# Patient Record
Sex: FEMALE | Race: WHITE | NOT HISPANIC OR LATINO | Employment: OTHER | URBAN - NONMETROPOLITAN AREA
[De-identification: names, ages, dates, MRNs, and addresses within clinical notes are randomized per-mention and may not be internally consistent; named-entity substitution may affect disease eponyms.]

---

## 2018-07-08 ENCOUNTER — APPOINTMENT (OUTPATIENT)
Dept: GENERAL RADIOLOGY | Facility: HOSPITAL | Age: 68
End: 2018-07-08

## 2018-07-08 ENCOUNTER — HOSPITAL ENCOUNTER (OUTPATIENT)
Facility: HOSPITAL | Age: 68
Discharge: HOME | End: 2018-07-09
Attending: EMERGENCY MEDICINE | Admitting: INTERNAL MEDICINE

## 2018-07-08 DIAGNOSIS — R77.8 ELEVATED TROPONIN: Primary | ICD-10-CM

## 2018-07-08 PROBLEM — R79.89 ELEVATED TROPONIN: Status: ACTIVE | Noted: 2018-07-08

## 2018-07-08 LAB
ALBUMIN SERPL-MCNC: 3.9 G/DL (ref 3.5–5)
ALBUMIN/GLOB SERPL: 1.2 G/DL (ref 1–2)
ALP SERPL-CCNC: 100 U/L (ref 38–126)
ALT SERPL W P-5'-P-CCNC: 34 U/L (ref 13–69)
ANION GAP SERPL CALCULATED.3IONS-SCNC: 12.6 MMOL/L (ref 10–20)
AST SERPL-CCNC: 47 U/L (ref 15–46)
BASOPHILS # BLD AUTO: 0.05 10*3/MM3 (ref 0–0.2)
BASOPHILS NFR BLD AUTO: 0.8 % (ref 0–2.5)
BILIRUB SERPL-MCNC: 0.6 MG/DL (ref 0.2–1.3)
BUN BLD-MCNC: 15 MG/DL (ref 7–20)
BUN/CREAT SERPL: 18.8 (ref 7.1–23.5)
CALCIUM SPEC-SCNC: 9.5 MG/DL (ref 8.4–10.2)
CHLORIDE SERPL-SCNC: 99 MMOL/L (ref 98–107)
CHOLEST SERPL-MCNC: 197 MG/DL (ref 0–199)
CO2 SERPL-SCNC: 31 MMOL/L (ref 26–30)
CREAT BLD-MCNC: 0.8 MG/DL (ref 0.6–1.3)
DEPRECATED RDW RBC AUTO: 49.1 FL (ref 37–54)
EOSINOPHIL # BLD AUTO: 0.16 10*3/MM3 (ref 0–0.7)
EOSINOPHIL NFR BLD AUTO: 2.4 % (ref 0–7)
ERYTHROCYTE [DISTWIDTH] IN BLOOD BY AUTOMATED COUNT: 14.3 % (ref 11.5–14.5)
GFR SERPL CREATININE-BSD FRML MDRD: 71 ML/MIN/1.73
GLOBULIN UR ELPH-MCNC: 3.3 GM/DL
GLUCOSE BLD-MCNC: 104 MG/DL (ref 74–98)
HCT VFR BLD AUTO: 36.9 % (ref 37–47)
HDLC SERPL-MCNC: 51 MG/DL (ref 40–60)
HGB BLD-MCNC: 11.9 G/DL (ref 12–16)
HIGH SENSITIVE C-REACTIVE PROTEIN MG/L: 7.06 MG/L (ref 1–3)
IMM GRANULOCYTES # BLD: 0.02 10*3/MM3 (ref 0–0.06)
IMM GRANULOCYTES NFR BLD: 0.3 % (ref 0–0.6)
LDLC SERPL CALC-MCNC: 128 MG/DL (ref 0–99)
LDLC/HDLC SERPL: 2.52 {RATIO}
LYMPHOCYTES # BLD AUTO: 1.84 10*3/MM3 (ref 0.6–3.4)
LYMPHOCYTES NFR BLD AUTO: 28.1 % (ref 10–50)
MCH RBC QN AUTO: 29.8 PG (ref 27–31)
MCHC RBC AUTO-ENTMCNC: 32.2 G/DL (ref 30–37)
MCV RBC AUTO: 92.5 FL (ref 81–99)
MONOCYTES # BLD AUTO: 0.85 10*3/MM3 (ref 0–0.9)
MONOCYTES NFR BLD AUTO: 13 % (ref 0–12)
NEUTROPHILS # BLD AUTO: 3.62 10*3/MM3 (ref 2–6.9)
NEUTROPHILS NFR BLD AUTO: 55.4 % (ref 37–80)
NRBC BLD MANUAL-RTO: 0 /100 WBC (ref 0–0)
PLATELET # BLD AUTO: 181 10*3/MM3 (ref 130–400)
PMV BLD AUTO: 10.5 FL (ref 6–12)
POTASSIUM BLD-SCNC: 3.6 MMOL/L (ref 3.5–5.1)
PROT SERPL-MCNC: 7.2 G/DL (ref 6.3–8.2)
RBC # BLD AUTO: 3.99 10*6/MM3 (ref 4.2–5.4)
SODIUM BLD-SCNC: 139 MMOL/L (ref 137–145)
TRIGL SERPL-MCNC: 88 MG/DL
TROPONIN I SERPL-MCNC: 0.06 NG/ML (ref 0–0.03)
TROPONIN I SERPL-MCNC: 0.18 NG/ML (ref 0–0.03)
TROPONIN I SERPL-MCNC: 0.38 NG/ML (ref 0–0.03)
VLDLC SERPL-MCNC: 17.6 MG/DL
WBC NRBC COR # BLD: 6.54 10*3/MM3 (ref 4.8–10.8)

## 2018-07-08 PROCEDURE — 80061 LIPID PANEL: CPT | Performed by: INTERNAL MEDICINE

## 2018-07-08 PROCEDURE — G0378 HOSPITAL OBSERVATION PER HR: HCPCS

## 2018-07-08 PROCEDURE — 94640 AIRWAY INHALATION TREATMENT: CPT

## 2018-07-08 PROCEDURE — 94799 UNLISTED PULMONARY SVC/PX: CPT

## 2018-07-08 PROCEDURE — 93005 ELECTROCARDIOGRAM TRACING: CPT | Performed by: PHYSICIAN ASSISTANT

## 2018-07-08 PROCEDURE — 25010000002 ENOXAPARIN PER 10 MG: Performed by: PHYSICIAN ASSISTANT

## 2018-07-08 PROCEDURE — 99220 PR INITIAL OBSERVATION CARE/DAY 70 MINUTES: CPT | Performed by: INTERNAL MEDICINE

## 2018-07-08 PROCEDURE — 80053 COMPREHEN METABOLIC PANEL: CPT | Performed by: PHYSICIAN ASSISTANT

## 2018-07-08 PROCEDURE — 99285 EMERGENCY DEPT VISIT HI MDM: CPT

## 2018-07-08 PROCEDURE — 85025 COMPLETE CBC W/AUTO DIFF WBC: CPT | Performed by: PHYSICIAN ASSISTANT

## 2018-07-08 PROCEDURE — 84443 ASSAY THYROID STIM HORMONE: CPT | Performed by: INTERNAL MEDICINE

## 2018-07-08 PROCEDURE — 96361 HYDRATE IV INFUSION ADD-ON: CPT

## 2018-07-08 PROCEDURE — 83036 HEMOGLOBIN GLYCOSYLATED A1C: CPT | Performed by: INTERNAL MEDICINE

## 2018-07-08 PROCEDURE — 25010000002 METHYLPREDNISOLONE PER 125 MG: Performed by: PHYSICIAN ASSISTANT

## 2018-07-08 PROCEDURE — 84484 ASSAY OF TROPONIN QUANT: CPT | Performed by: PHYSICIAN ASSISTANT

## 2018-07-08 PROCEDURE — 86141 C-REACTIVE PROTEIN HS: CPT | Performed by: INTERNAL MEDICINE

## 2018-07-08 PROCEDURE — 84484 ASSAY OF TROPONIN QUANT: CPT | Performed by: INTERNAL MEDICINE

## 2018-07-08 PROCEDURE — 96372 THER/PROPH/DIAG INJ SC/IM: CPT

## 2018-07-08 PROCEDURE — 71046 X-RAY EXAM CHEST 2 VIEWS: CPT

## 2018-07-08 PROCEDURE — 96374 THER/PROPH/DIAG INJ IV PUSH: CPT

## 2018-07-08 PROCEDURE — 84439 ASSAY OF FREE THYROXINE: CPT | Performed by: INTERNAL MEDICINE

## 2018-07-08 RX ORDER — IPRATROPIUM BROMIDE AND ALBUTEROL SULFATE 2.5; .5 MG/3ML; MG/3ML
3 SOLUTION RESPIRATORY (INHALATION) EVERY 4 HOURS PRN
Status: DISCONTINUED | OUTPATIENT
Start: 2018-07-08 | End: 2018-07-09 | Stop reason: HOSPADM

## 2018-07-08 RX ORDER — NAPROXEN SODIUM 220 MG
220 TABLET ORAL 2 TIMES DAILY PRN
COMMUNITY
End: 2018-07-09 | Stop reason: HOSPADM

## 2018-07-08 RX ORDER — AMLODIPINE BESYLATE 5 MG/1
5 TABLET ORAL
Status: DISCONTINUED | OUTPATIENT
Start: 2018-07-08 | End: 2018-07-08

## 2018-07-08 RX ORDER — LOSARTAN POTASSIUM AND HYDROCHLOROTHIAZIDE 25; 100 MG/1; MG/1
1 TABLET ORAL DAILY
COMMUNITY
End: 2018-07-09 | Stop reason: HOSPADM

## 2018-07-08 RX ORDER — NITROGLYCERIN 0.4 MG/1
0.4 TABLET SUBLINGUAL
Status: DISCONTINUED | OUTPATIENT
Start: 2018-07-08 | End: 2018-07-09 | Stop reason: HOSPADM

## 2018-07-08 RX ORDER — ATORVASTATIN CALCIUM 80 MG/1
80 TABLET, FILM COATED ORAL NIGHTLY
Status: DISCONTINUED | OUTPATIENT
Start: 2018-07-08 | End: 2018-07-09 | Stop reason: HOSPADM

## 2018-07-08 RX ORDER — METHYLPREDNISOLONE SODIUM SUCCINATE 125 MG/2ML
125 INJECTION, POWDER, LYOPHILIZED, FOR SOLUTION INTRAMUSCULAR; INTRAVENOUS ONCE
Status: COMPLETED | OUTPATIENT
Start: 2018-07-08 | End: 2018-07-08

## 2018-07-08 RX ORDER — CLOPIDOGREL BISULFATE 75 MG/1
600 TABLET ORAL ONCE
Status: COMPLETED | OUTPATIENT
Start: 2018-07-08 | End: 2018-07-08

## 2018-07-08 RX ORDER — LISINOPRIL 20 MG/1
20 TABLET ORAL
Status: DISCONTINUED | OUTPATIENT
Start: 2018-07-09 | End: 2018-07-09

## 2018-07-08 RX ORDER — ASPIRIN 81 MG/1
324 TABLET, CHEWABLE ORAL ONCE
Status: COMPLETED | OUTPATIENT
Start: 2018-07-08 | End: 2018-07-08

## 2018-07-08 RX ORDER — AMLODIPINE BESYLATE 5 MG/1
2.5 TABLET ORAL
Status: DISCONTINUED | OUTPATIENT
Start: 2018-07-08 | End: 2018-07-09

## 2018-07-08 RX ORDER — PANTOPRAZOLE SODIUM 40 MG/1
40 TABLET, DELAYED RELEASE ORAL EVERY MORNING
Status: DISCONTINUED | OUTPATIENT
Start: 2018-07-09 | End: 2018-07-09 | Stop reason: HOSPADM

## 2018-07-08 RX ORDER — SODIUM CHLORIDE 0.9 % (FLUSH) 0.9 %
10 SYRINGE (ML) INJECTION AS NEEDED
Status: DISCONTINUED | OUTPATIENT
Start: 2018-07-08 | End: 2018-07-09 | Stop reason: HOSPADM

## 2018-07-08 RX ORDER — ASPIRIN 81 MG/1
81 TABLET, CHEWABLE ORAL DAILY
Status: DISCONTINUED | OUTPATIENT
Start: 2018-07-09 | End: 2018-07-09 | Stop reason: HOSPADM

## 2018-07-08 RX ORDER — SODIUM CHLORIDE 0.9 % (FLUSH) 0.9 %
1-10 SYRINGE (ML) INJECTION AS NEEDED
Status: DISCONTINUED | OUTPATIENT
Start: 2018-07-08 | End: 2018-07-09 | Stop reason: HOSPADM

## 2018-07-08 RX ORDER — IPRATROPIUM BROMIDE AND ALBUTEROL SULFATE 2.5; .5 MG/3ML; MG/3ML
3 SOLUTION RESPIRATORY (INHALATION) ONCE
Status: COMPLETED | OUTPATIENT
Start: 2018-07-08 | End: 2018-07-08

## 2018-07-08 RX ORDER — METOPROLOL SUCCINATE 50 MG/1
50 TABLET, EXTENDED RELEASE ORAL
Status: DISCONTINUED | OUTPATIENT
Start: 2018-07-08 | End: 2018-07-09

## 2018-07-08 RX ORDER — LEVOCETIRIZINE DIHYDROCHLORIDE 5 MG/1
5 TABLET, FILM COATED ORAL EVERY EVENING
COMMUNITY
End: 2021-03-19 | Stop reason: SDUPTHER

## 2018-07-08 RX ORDER — MULTIPLE VITAMINS W/ MINERALS TAB 9MG-400MCG
1 TAB ORAL DAILY
COMMUNITY

## 2018-07-08 RX ADMIN — ATORVASTATIN CALCIUM 80 MG: 80 TABLET, FILM COATED ORAL at 23:31

## 2018-07-08 RX ADMIN — ENOXAPARIN SODIUM 80 MG: 80 INJECTION SUBCUTANEOUS at 18:35

## 2018-07-08 RX ADMIN — IPRATROPIUM BROMIDE AND ALBUTEROL SULFATE 3 ML: .5; 3 SOLUTION RESPIRATORY (INHALATION) at 15:28

## 2018-07-08 RX ADMIN — AMLODIPINE BESYLATE 2.5 MG: 5 TABLET ORAL at 23:31

## 2018-07-08 RX ADMIN — SODIUM CHLORIDE 1000 ML: 9 INJECTION, SOLUTION INTRAVENOUS at 16:12

## 2018-07-08 RX ADMIN — CLOPIDOGREL BISULFATE 600 MG: 75 TABLET ORAL at 18:35

## 2018-07-08 RX ADMIN — METOPROLOL SUCCINATE 50 MG: 50 TABLET, EXTENDED RELEASE ORAL at 23:31

## 2018-07-08 RX ADMIN — ASPIRIN 81 MG 324 MG: 81 TABLET ORAL at 17:01

## 2018-07-08 RX ADMIN — METHYLPREDNISOLONE SODIUM SUCCINATE 125 MG: 125 INJECTION, POWDER, FOR SOLUTION INTRAMUSCULAR; INTRAVENOUS at 16:12

## 2018-07-08 NOTE — NURSING NOTE
Received report from ed nurse AMISHA Patel. Patient was transported into room 304 at approx. 1900. Patient is without s/s of distress, call light within reach, needs met at this time, will pass report to night shift nurse AMISHA Ring.

## 2018-07-08 NOTE — H&P
[facility]   HISTORY AND PHYSICAL      Name:  Steven Hardy   Age:  68 y.o.  Sex:  female  :  1950  MRN:  [id number]   Visit Number:  [id number]  Admission Date:  2018  Date Of Service:  18    Primary Care Physician:  Amaury Moses MD    Chief Complaint:     Elevated troponins after an episode off difficulty in breathing due to using a bugs removal spray, Flea fogger    History Of Presenting Illness:      Miss Hardy is a pleasant 68-year-old  lady with history of hypertension, GERD and allergies using regular allergy shots.  She describes having used of fuming spray at her home to remove bugs but soon after developed shortness of breath.  She called her daughter who immediately called 911 and she was transferred to the emergency room.  Throughout this.  She denied chest pain, chest tightness, palpitations or syncope.  She was treated with intravenous corticosteroids and nebulized treatment in emergency room which significantly improved the symptoms and she almost feels back to normal.  However troponin levels were found to be elevated.    Review Of Systems:     Review of Systems   Constitutional: Negative.  Negative for activity change, appetite change, chills, diaphoresis, fatigue, fever and unexpected weight change.   HENT: Negative.  Negative for congestion, dental problem, drooling, ear discharge, ear pain, facial swelling, hearing loss, mouth sores, nosebleeds, postnasal drip, rhinorrhea, sinus pain, sinus pressure, sneezing, sore throat, tinnitus, trouble swallowing and voice change.    Eyes: Negative.  Negative for photophobia, pain, discharge, redness, itching and visual disturbance.   Respiratory: Positive for cough, choking (Resolved now) and shortness of breath. Negative for apnea, chest tightness, wheezing and stridor.    Cardiovascular: Negative.  Negative for chest pain, palpitations and leg swelling.   Gastrointestinal: Negative.  Negative for  abdominal distention, abdominal pain, anal bleeding, blood in stool, constipation, diarrhea, nausea, rectal pain and vomiting.   Endocrine: Negative.  Negative for cold intolerance, heat intolerance, polydipsia, polyphagia and polyuria.   Genitourinary: Negative.  Negative for decreased urine volume, difficulty urinating, dysuria, enuresis, flank pain, frequency, genital sores, hematuria and urgency.   Musculoskeletal: Negative.  Negative for arthralgias, back pain, gait problem, joint swelling, myalgias, neck pain and neck stiffness.   Skin: Negative.  Negative for color change, pallor, rash and wound.   Allergic/Immunologic: Negative.  Negative for environmental allergies, food allergies and immunocompromised state.   Neurological: Negative.  Negative for dizziness, tremors, seizures, syncope, facial asymmetry, speech difficulty, weakness, light-headedness, numbness and headaches.   Hematological: Negative.  Negative for adenopathy. Does not bruise/bleed easily.   Psychiatric/Behavioral: Negative.  Negative for agitation, behavioral problems, confusion, decreased concentration, dysphoric mood, hallucinations, self-injury, sleep disturbance and suicidal ideas. The patient is not nervous/anxious and is not hyperactive.         Past Medical History:    Past Medical History:   Diagnosis Date   • GERD (gastroesophageal reflux disease)    • H/O seasonal allergies    • Hypertension        Past Surgical history:    Past Surgical History:   Procedure Laterality Date   •  SECTION         Social History:    Social History     Social History   • Marital status: Single     Spouse name: N/A   • Number of children: N/A   • Years of education: N/A     Occupational History   • Not on file.     Social History Main Topics   • Smoking status: Never Smoker   • Smokeless tobacco: Not on file   • Alcohol use No   • Drug use: No   • Sexual activity: Not on file     Other Topics Concern   • Not on file     Social History Narrative    • No narrative on file       Family History:    History reviewed. No pertinent family history.    Allergies:      Patient has no known allergies.    Home Medications:    Prior to Admission Medications     Prescriptions Last Dose Informant Patient Reported? Taking?    ALLERGY SERUM INJECTION   Yes No    Inject  under the skin 1 (One) Time.    amoxicillin-clavulanate (AUGMENTIN) 875-125 MG per tablet   No No    Take 1 tablet by mouth 2 (Two) Times a Day.    lisinopril (PRINIVIL,ZESTRIL) 20 MG tablet   Yes No    Take 20 mg by mouth Daily.    omeprazole (priLOSEC) 20 MG capsule   Yes No    Take 20 mg by mouth Daily.    phenylephrine-promethazine-codeine (PHENERGAN VC with CODEINE) 5-6.25-10 MG/5ML syrup syrup   No No    Take 5 mL by mouth Every 4 (Four) Hours As Needed (no driving or operating machines while using this medication).             ED Medications:    Medications   sodium chloride 0.9 % flush 10 mL (not administered)   pantoprazole (PROTONIX) EC tablet 40 mg (not administered)   sodium chloride 0.9 % flush 1-10 mL (not administered)   enoxaparin (LOVENOX) syringe 40 mg (not administered)   aspirin chewable tablet 81 mg (not administered)   ipratropium-albuterol (DUO-NEB) nebulizer solution 3 mL (not administered)   sodium chloride 0.9 % bolus 1,000 mL (0 mL Intravenous Stopped 7/8/18 1823)   methylPREDNISolone sodium succinate (SOLU-Medrol) injection 125 mg (125 mg Intravenous Given 7/8/18 1612)   ipratropium-albuterol (DUO-NEB) nebulizer solution 3 mL (3 mL Nebulization Given 7/8/18 1528)   aspirin chewable tablet 324 mg (324 mg Oral Given 7/8/18 1701)   enoxaparin (LOVENOX) syringe 80 mg (80 mg Subcutaneous Given 7/8/18 1835)   clopidogrel (PLAVIX) tablet 600 mg (600 mg Oral Given 7/8/18 1835)       Vital Signs:    Temp:  [97.8 °F (36.6 °C)-98.1 °F (36.7 °C)] 97.8 °F (36.6 °C)  Heart Rate:  [] 100  Resp:  [18-20] 18  BP: (137-173)/(73-81) 155/77    1    07/08/18  1501 07/08/18  1857   Weight: 83.9  kg (185 lb) 114 kg (251 lb 5.2 oz)       Body mass index is 44.52 kg/m².    Physical Exam:    Physical Exam   Constitutional: She is oriented to person, place, and time. She appears well-developed and well-nourished.   HENT:   Head: Normocephalic.   Nose: Nose normal.   Mouth/Throat: Oropharynx is clear and moist. No oropharyngeal exudate.   Eyes: Conjunctivae and EOM are normal. Pupils are equal, round, and reactive to light. No scleral icterus.   Neck: Normal range of motion. Neck supple. No tracheal deviation present. No thyromegaly present.   Cardiovascular: Normal rate, regular rhythm, normal heart sounds and intact distal pulses.  Exam reveals no gallop and no friction rub.    Pulmonary/Chest: Effort normal and breath sounds normal. No respiratory distress. She has no wheezes. She exhibits no tenderness.   Abdominal: Soft. Bowel sounds are normal. She exhibits no distension. There is no tenderness. There is no rebound and no guarding. No hernia.   Musculoskeletal: Normal range of motion.   No muscle pain. No joint pains.   Lymphadenopathy:     She has no cervical adenopathy.   Neurological: She is alert and oriented to person, place, and time. She displays normal reflexes. She exhibits normal muscle tone. Coordination normal.   Skin: Skin is warm.   Psychiatric: She has a normal mood and affect. Judgment and thought content normal.   Vitals reviewed.      Laboratory data:    I have reviewed the labs done in the emergency room.      Results from last 7 days  Lab Units 07/08/18  1608   SODIUM mmol/L 139   POTASSIUM mmol/L 3.6   CHLORIDE mmol/L 99   CO2 mmol/L 31.0*   BUN mg/dL 15   CREATININE mg/dL 0.80   CALCIUM mg/dL 9.5   BILIRUBIN mg/dL 0.6   ALK PHOS U/L 100   ALT (SGPT) U/L 34   AST (SGOT) U/L 47*   GLUCOSE mg/dL 104*       Results from last 7 days  Lab Units 07/08/18  1608   WBC 10*3/mm3 6.54   HEMOGLOBIN g/dL 11.9*   HEMATOCRIT % 36.9*   PLATELETS 10*3/mm3 181           Results from last 7 days  Lab  Units 07/08/18  1708 07/08/18  1608   TROPONIN I ng/mL 0.178* 0.061*                           Invalid input(s): USDES,  BLOODU, NITRITITE, BACT, EP  Pain Management Panel     There is no flowsheet data to display.              EKG:      Normal sinus rhythm with no obvious evidence of ischemia or hypertrophy.    Radiology:    Imaging Results (last 72 hours)     Procedure Component Value Units Date/Time    XR Chest 2 View [[id number]] Collected:  07/08/18 1520     Updated:  07/08/18 1535    Narrative:       PROCEDURE: XR CHEST 2 VW-        HISTORY: allergic rxn     COMPARISON: None.     FINDINGS: The heart is normal in size. The mediastinum is unremarkable.  The lungs are clear. There is no pneumothorax. There are no acute  osseous abnormalities. There is loss of disc space height and anterior  osteophyte formation throughout the thoracic spine.       Impression:       No acute cardiopulmonary process.           This report was finalized on 7/8/2018 3:20 PM by Tiffanie Jiménez M.D..          Active Problems:    Elevated troponin      Assessment:    Elevated troponins.  Episode of shortness of breath and choking after using a smoke to get rid of bugs    Plan:    1. Admit to telemetry for cardiac monitoring  2. Patient has received aspirin and Plavix emergency room and is currently asymptomatic, Dr. Tidwell is aware about the admission and will review the patient in the morning  3. DVT and GI prophylaxis  4. I had a detailed discussion with Patient and accompanying family members.    Kenny Beltran MD  07/08/18  7:32 PM    Portions of this note may have been completed with Dragon, a voice recognition program. Not all errors in transcription may have been detected prior to signing.

## 2018-07-08 NOTE — ED PROVIDER NOTES
Subjective   Patient is here via EMS with reported possible allergic reaction after walking into a room that she had used a bug bomb,, fumigate.. She does endorse history of seasonal allergies, takes allergy shots for this denies chest pain  Did have shortness of air...feels better currently than initially when EMS arrived again no chest pain  Some shortness of air,.resolved now. No abdominal pain no nausea vomiting        History provided by:  Patient      Review of Systems   Constitutional: Negative.    HENT: Negative.    Eyes: Negative.    Respiratory: Negative.  Negative for chest tightness and stridor.    Cardiovascular: Negative.    Gastrointestinal: Negative.    Genitourinary: Negative.    Musculoskeletal: Negative.    Skin: Negative.    Neurological: Negative.    Psychiatric/Behavioral: The patient is nervous/anxious.    All other systems reviewed and are negative.      Past Medical History:   Diagnosis Date   • GERD (gastroesophageal reflux disease)    • H/O seasonal allergies    • Hypertension        No Known Allergies    Past Surgical History:   Procedure Laterality Date   •  SECTION         History reviewed. No pertinent family history.    Social History     Social History   • Marital status: Single     Social History Main Topics   • Smoking status: Never Smoker   • Alcohol use No   • Drug use: No     Other Topics Concern   • Not on file           Objective   Physical Exam   Constitutional: She appears well-developed and well-nourished.   Afebrile nontoxic no acute distress   HENT:   Head: Normocephalic and atraumatic.   Mouth/Throat: Oropharynx is clear and moist. No oropharyngeal exudate.   Eyes: Conjunctivae are normal. Pupils are equal, round, and reactive to light.   Neck: Normal range of motion. Neck supple.   Cardiovascular: Normal rate, regular rhythm and intact distal pulses.    Pulmonary/Chest: Effort normal. No respiratory distress. She has rales.   Abdominal: Soft. She exhibits no  mass. There is no tenderness.   Musculoskeletal: Normal range of motion. She exhibits no edema.   Skin: Skin is warm. Capillary refill takes less than 2 seconds. No rash noted. She is not diaphoretic. No erythema.   Psychiatric: Her behavior is normal. Judgment and thought content normal.   Nursing note and vitals reviewed.      Procedures           ED Course  ED Course as of Jul 08 1809   Sun Jul 08, 2018   1550 Patient resting feels better no acute distress  [SC]   1600 EKG interpreted by me: Sinus rhythm, normal rate, normal axis/intervals, low voltage QRS complexes, this is an abnormal EKG, no previous for comparison  [MP]   1610 EKG reviewed Dr Jones  [SC]   1643 Pt case labs management reviewed with Dr. Jones  [SC]   1725 Patient resting comfortably no distress no chest pain or shortness of air states she feels better  [SC]   1743 Page Dr Tidwell  [SC]   1747 Discussed with Dr. Tidwell will admit to hospitalist he will consult  [SC]   3782 Discussed with ./..  Accepts admission  [SC]      ED Course User Index  [MP] Lisandro Jones MD  [SC] Gio Michael PA-C                  [facility]      Final diagnoses:   Elevated troponin            Gio Michael PA-C  07/08/18 3501

## 2018-07-08 NOTE — ED NOTES
Requested tele bed from Rei Unger, @ 8401. Will call back w/ bed assignment.      Alma Rosa Monreal  07/08/18 5138

## 2018-07-08 NOTE — ED NOTES
Rei Unger, assigned Room 304/Tele @ 1804. AMISHA Richardson, notified.      Alma Rosa Monreal  07/08/18 1800

## 2018-07-09 ENCOUNTER — APPOINTMENT (OUTPATIENT)
Dept: CARDIOLOGY | Facility: HOSPITAL | Age: 68
End: 2018-07-09
Attending: INTERNAL MEDICINE

## 2018-07-09 VITALS
TEMPERATURE: 98.4 F | OXYGEN SATURATION: 96 % | DIASTOLIC BLOOD PRESSURE: 56 MMHG | HEIGHT: 63 IN | SYSTOLIC BLOOD PRESSURE: 134 MMHG | WEIGHT: 251.32 LBS | HEART RATE: 67 BPM | BODY MASS INDEX: 44.53 KG/M2 | RESPIRATION RATE: 16 BRPM

## 2018-07-09 PROBLEM — IMO0001 CLASS 3 OBESITY DUE TO EXCESS CALORIES WITH SERIOUS COMORBIDITY AND BODY MASS INDEX (BMI) OF 40.0 TO 44.9 IN ADULT: Status: ACTIVE | Noted: 2018-07-09

## 2018-07-09 PROBLEM — I21.4 ACUTE NON Q WAVE MI (MYOCARDIAL INFARCTION), INITIAL EPISODE OF CARE (HCC): Status: ACTIVE | Noted: 2018-07-09

## 2018-07-09 PROBLEM — I10 ESSENTIAL HYPERTENSION: Status: ACTIVE | Noted: 2018-07-09

## 2018-07-09 LAB
HBA1C MFR BLD: 6.3 % (ref 3–6)
MAXIMAL PREDICTED HEART RATE: 152 BPM
STRESS TARGET HR: 129 BPM
T4 FREE SERPL-MCNC: 1.28 NG/DL (ref 0.78–2.19)
TROPONIN I SERPL-MCNC: 0.37 NG/ML (ref 0–0.03)
TROPONIN I SERPL-MCNC: 0.55 NG/ML (ref 0–0.03)
TSH SERPL DL<=0.05 MIU/L-ACNC: 0.8 MIU/ML (ref 0.47–4.68)

## 2018-07-09 PROCEDURE — 63710000001 LOSARTAN 50 MG TABLET: Performed by: NURSE PRACTITIONER

## 2018-07-09 PROCEDURE — 99217 PR OBSERVATION CARE DISCHARGE MANAGEMENT: CPT | Performed by: NURSE PRACTITIONER

## 2018-07-09 PROCEDURE — 93458 L HRT ARTERY/VENTRICLE ANGIO: CPT | Performed by: INTERNAL MEDICINE

## 2018-07-09 PROCEDURE — 0 IOPAMIDOL PER 1 ML: Performed by: INTERNAL MEDICINE

## 2018-07-09 PROCEDURE — 25010000002 MIDAZOLAM PER 1 MG: Performed by: INTERNAL MEDICINE

## 2018-07-09 PROCEDURE — C1894 INTRO/SHEATH, NON-LASER: HCPCS | Performed by: INTERNAL MEDICINE

## 2018-07-09 PROCEDURE — 84484 ASSAY OF TROPONIN QUANT: CPT | Performed by: NURSE PRACTITIONER

## 2018-07-09 PROCEDURE — 84484 ASSAY OF TROPONIN QUANT: CPT | Performed by: INTERNAL MEDICINE

## 2018-07-09 PROCEDURE — A9270 NON-COVERED ITEM OR SERVICE: HCPCS | Performed by: INTERNAL MEDICINE

## 2018-07-09 PROCEDURE — 93005 ELECTROCARDIOGRAM TRACING: CPT | Performed by: INTERNAL MEDICINE

## 2018-07-09 PROCEDURE — 63710000001 ASPIRIN 81 MG CHEWABLE TABLET: Performed by: INTERNAL MEDICINE

## 2018-07-09 PROCEDURE — G0378 HOSPITAL OBSERVATION PER HR: HCPCS

## 2018-07-09 PROCEDURE — C1769 GUIDE WIRE: HCPCS | Performed by: INTERNAL MEDICINE

## 2018-07-09 PROCEDURE — 25010000002 FENTANYL CITRATE (PF) 100 MCG/2ML SOLUTION: Performed by: INTERNAL MEDICINE

## 2018-07-09 PROCEDURE — 93306 TTE W/DOPPLER COMPLETE: CPT

## 2018-07-09 PROCEDURE — A9270 NON-COVERED ITEM OR SERVICE: HCPCS | Performed by: NURSE PRACTITIONER

## 2018-07-09 PROCEDURE — 25010000002 HEPARIN (PORCINE) PER 1000 UNITS: Performed by: INTERNAL MEDICINE

## 2018-07-09 RX ORDER — METOPROLOL SUCCINATE 25 MG/1
25 TABLET, EXTENDED RELEASE ORAL
Qty: 30 TABLET | Refills: 0 | Status: SHIPPED | OUTPATIENT
Start: 2018-07-10 | End: 2021-03-19

## 2018-07-09 RX ORDER — ATORVASTATIN CALCIUM 20 MG/1
20 TABLET, FILM COATED ORAL NIGHTLY
Qty: 30 TABLET | Refills: 0 | Status: SHIPPED | OUTPATIENT
Start: 2018-07-09

## 2018-07-09 RX ORDER — ACETAMINOPHEN 325 MG/1
650 TABLET ORAL EVERY 4 HOURS PRN
Status: DISCONTINUED | OUTPATIENT
Start: 2018-07-09 | End: 2018-07-09 | Stop reason: HOSPADM

## 2018-07-09 RX ORDER — METOPROLOL SUCCINATE 25 MG/1
25 TABLET, EXTENDED RELEASE ORAL
Status: DISCONTINUED | OUTPATIENT
Start: 2018-07-10 | End: 2018-07-09 | Stop reason: HOSPADM

## 2018-07-09 RX ORDER — BLOOD-GLUCOSE METER
EACH MISCELLANEOUS
Qty: 1 KIT | Refills: 0 | Status: SHIPPED | OUTPATIENT
Start: 2018-07-09

## 2018-07-09 RX ORDER — FENTANYL CITRATE 50 UG/ML
INJECTION, SOLUTION INTRAMUSCULAR; INTRAVENOUS AS NEEDED
Status: DISCONTINUED | OUTPATIENT
Start: 2018-07-09 | End: 2018-07-09 | Stop reason: HOSPADM

## 2018-07-09 RX ORDER — AMLODIPINE BESYLATE 5 MG/1
5 TABLET ORAL
Status: DISCONTINUED | OUTPATIENT
Start: 2018-07-10 | End: 2018-07-09 | Stop reason: HOSPADM

## 2018-07-09 RX ORDER — BLOOD-GLUCOSE METER
KIT MISCELLANEOUS
Qty: 100 EACH | Refills: 0 | Status: SHIPPED | OUTPATIENT
Start: 2018-07-09 | End: 2018-07-09

## 2018-07-09 RX ORDER — HYDROCODONE BITARTRATE AND ACETAMINOPHEN 5; 325 MG/1; MG/1
1 TABLET ORAL EVERY 4 HOURS PRN
Status: DISCONTINUED | OUTPATIENT
Start: 2018-07-09 | End: 2018-07-09 | Stop reason: HOSPADM

## 2018-07-09 RX ORDER — LIDOCAINE HYDROCHLORIDE 10 MG/ML
INJECTION, SOLUTION INFILTRATION; PERINEURAL AS NEEDED
Status: DISCONTINUED | OUTPATIENT
Start: 2018-07-09 | End: 2018-07-09 | Stop reason: HOSPADM

## 2018-07-09 RX ORDER — LOSARTAN POTASSIUM 50 MG/1
100 TABLET ORAL
Status: DISCONTINUED | OUTPATIENT
Start: 2018-07-09 | End: 2018-07-09 | Stop reason: HOSPADM

## 2018-07-09 RX ORDER — AMLODIPINE BESYLATE 5 MG/1
5 TABLET ORAL
Qty: 30 TABLET | Refills: 0 | Status: SHIPPED | OUTPATIENT
Start: 2018-07-10 | End: 2021-03-19

## 2018-07-09 RX ORDER — ASPIRIN 81 MG/1
81 TABLET, CHEWABLE ORAL DAILY
Qty: 30 TABLET | Refills: 0 | Status: SHIPPED | OUTPATIENT
Start: 2018-07-10 | End: 2021-03-27 | Stop reason: HOSPADM

## 2018-07-09 RX ORDER — SODIUM CHLORIDE 9 MG/ML
100 INJECTION, SOLUTION INTRAVENOUS CONTINUOUS
Status: DISCONTINUED | OUTPATIENT
Start: 2018-07-09 | End: 2018-07-09

## 2018-07-09 RX ORDER — MIDAZOLAM HYDROCHLORIDE 1 MG/ML
INJECTION INTRAMUSCULAR; INTRAVENOUS AS NEEDED
Status: DISCONTINUED | OUTPATIENT
Start: 2018-07-09 | End: 2018-07-09 | Stop reason: HOSPADM

## 2018-07-09 RX ORDER — LOSARTAN POTASSIUM 50 MG/1
50 TABLET ORAL
Qty: 30 TABLET | Refills: 0 | Status: SHIPPED | OUTPATIENT
Start: 2018-07-10

## 2018-07-09 RX ADMIN — LOSARTAN POTASSIUM 100 MG: 50 TABLET, FILM COATED ORAL at 09:45

## 2018-07-09 RX ADMIN — PANTOPRAZOLE SODIUM 40 MG: 40 TABLET, DELAYED RELEASE ORAL at 06:16

## 2018-07-09 RX ADMIN — ASPIRIN 81 MG 81 MG: 81 TABLET ORAL at 09:45

## 2018-07-09 NOTE — PROGRESS NOTES
Discharge Planning Assessment  [facility]     Patient Name: Steven Hardy  MRN: [id number]  Today's Date: 7/9/2018    Admit Date: 7/8/2018          Discharge Needs Assessment     Row Name 07/09/18 0942       Living Environment    Lives With alone    Unique Family Situation --   States brother stays with her sometimes    Primary Care Provided by self    Provides Primary Care For no one    Family Caregiver if Needed none    Able to Return to Prior Arrangements yes       Discharge Needs Assessment    Readmission Within the Last 30 Days no previous admission in last 30 days    Concerns to be Addressed no discharge needs identified    Anticipated Changes Related to Illness none    Equipment Needed After Discharge none    Offered/Gave Vendor List other (see comments)   none needed            Discharge Plan     Row Name 07/09/18 0947       Plan    Plan Spoke to pt in room.  has no POA.  Address and phone # is correct.  Lives in 2 story house, brother stays with her often.  Has steps but does not have to use them.  Independent with all ADLS, has has no Medical equipment.  Plans to go home at discharge, has own transportation and denies discharge needs.          Destination     No service coordination in this encounter.      Durable Medical Equipment     No service coordination in this encounter.      Dialysis/Infusion     No service coordination in this encounter.      Home Medical Care     No service coordination in this encounter.      Social Care     No service coordination in this encounter.                Demographic Summary     Row Name 07/09/18 0954       General Information    Admission Type observation    Required Notices Provided Observation Status Notice    Referral Source admission list    Reason for Consult discharge planning            Functional Status     Row Name 07/09/18 0904       Mental Status Summary    Recent Changes in Mental Status/Cognitive Functioning no changes    Row Name 07/09/18 0920        Functional Status    Usual Activity Tolerance good    Current Activity Tolerance good       Functional Status, IADL    Medications independent    Meal Preparation independent    Housekeeping independent    Laundry independent    Shopping independent            Psychosocial     Row Name 07/09/18 0933       Coping/Stress    Major Change/Loss/Stressor none            Abuse/Neglect    No documentation.           Legal    No documentation.           Substance Abuse    No documentation.           Patient Forms    No documentation.         Nori Brown

## 2018-07-09 NOTE — DISCHARGE SUMMARY
[facility] HOSPITALIST   DISCHARGE SUMMARY    Name:  Steven Hardy   Age:  68 y.o.  Sex:  female  :  1950  MRN:  [id number]   Visit Number:  [id number]  Primary Care Physician:  Amaury Moses MD  Date of Discharge:  2018  Admission Date:  2018      Presenting Problem:    Elevated troponin [R74.8]       Discharge Diagnosis:     Principal Problem:    Acute non Q wave MI (myocardial infarction), initial episode of care (CMS/[facility])  Active Problems:    Elevated troponin    Essential hypertension    Class 3 obesity due to excess calories with serious comorbidity and body mass index (BMI) of 40.0 to 44.9 in adult (CMS/[facility])        Past Medical History:  Past Medical History:   Diagnosis Date   • GERD (gastroesophageal reflux disease)    • H/O seasonal allergies    • Hypertension          Consults:     Consults     Date and Time Order Name Status Description    2018 1803 Inpatient Cardiology Consult            Procedures Performed:    Procedure(s):  Left Heart Cath         History of presenting illness:  This is a 68-year-old female past medical history significant for hypertension, gastroesophageal reflux and allergies early on allergy injections.  Patient had been using a few minutes.  Her home to remove Biaxin shortly after she developed shortness of breath.  She called her daughter was brought into the emergency room.  She denied any chest pain, chest tightness, palpitations or syncope.  She was given IV steroids in the emergency room and this did alleviate the symptoms.  However she was noted to have elevated troponin on her baseline blood work.  She was admitted observation to the hospitalist service for further medical management.      Hospital Course:  Upon admission to the hospitalist service serial cardiac enzymes were done which did show further elevation of her troponin.  Dr. Tidwell with cardiology was consulted.  She was noted to have significantly elevated blood pressure.   He felt this may be secondary to her avoidance of beta blocker therapy.  He did start her on amlodipine in addition to her arm.  He discussed with the patient regarding starting her on an ACE inhibitor however according to the patient she cannot tolerated ACE inhibitor as it does cause cough.    Patient was taken down this morning where she did undergo a heart catheterization.  She was noted to have no blockages required intervention.  The catheterization report for details.    Patient was noted to have significantly elevated blood pressure.  She was started on beta blocker as well as continued on her losartan and Norvasc has been added as well.  She will need to monitor blood pressures at home and follow-up with her primary care provider.    Patient was also noted to have mildly elevated blood sugar for which a hemoglobin A1c was obtained.  Her A1c is 6.3.  I have asked diabetes educator to consult patient and give information on dietary control of her blood sugars.  I will also ask that patient check BS twice daily and keep a record of results.  Follow up with PCP to review the log of results.    Patient is currently lying in bed comfortably.  She has a TR band on the right wrist.  I have spoken with cardiology who feels that her next troponin is trending down and we can plan to discharge her home this afternoon.  I will give her prescriptions for any new medications and she will need to follow-up with her primary care provider in one week.  I also asked the patient monitors her blood sugars at home records this and takes and her primary care provider 1-2 weeks to follow-up.    Patient at this time denies any chest pain, shortness breath, abdominal pain, nausea vomiting.  She is otherwise stable from the hospitalist standpoint if her next troponin is trending down to discharge home.    Addendum:  Repeat troponin is improving.  We will discharge patient today and have her follow up with PCP in 1 week and cardiology  in 1 month.  I will give patient a prescription for glucometer, lancets and strips.  She need to check BS before breakfast and at bedtime.  Record a log of results and follow up with PCP in 2-4 weeks.      Vital Signs:    Temp:  [97.8 °F (36.6 °C)-98.4 °F (36.9 °C)] 98.4 °F (36.9 °C)  Heart Rate:  [] 57  Resp:  [16-20] 16  BP: (119-176)/(53-88) 122/88    Physical Exam:  General Appearance:    Alert and cooperative, not in any acute distress.   Head:    Atraumatic and normocephalic, without obvious abnormality.   Eyes:            PERRLA, conjunctivae and sclerae normal, no Icterus. No pallor. Extra-occular movements are within normal limits.   Ears:    Ears appear intact with no abnormalities noted.   Throat:   No oral lesions, no thrush, oral mucosa moist.   Neck:   Supple, trachea midline, no thyromegaly, no carotid bruit.   Back:     No kyphoscoliosis present. No tenderness to palpation,   range of motion normal.   Lungs:     Chest shape is normal. Breath sounds heard bilaterally equally.  No crackles or wheezing. No Pleural rub or bronchial breathing.    Heart:    Normal S1 and S2, no murmur, no gallop, no rub. No JVD   Abdomen:     Normal bowel sounds, no masses, no organomegaly. Soft        non-tender, non-distended, no guarding, no rebound                tenderness   Extremities:   Moves all extremities well, no edema, no cyanosis, no             Clubbing, TR band to right wrist.   Pulses:   Pulses palpable and equal bilaterally   Skin:   No bleeding, bruising or rash   Lymph nodes:  Psychiatric/Behavior:    No palpable adenopathy    Normal mood, normal behavior   Neurologic:   Cranial nerves 2 - 12 grossly intact, sensation intact, Motor power is normal and equal bilaterally.           Pertinent Lab Results:       Results from last 7 days  Lab Units 07/08/18  1608   SODIUM mmol/L 139   POTASSIUM mmol/L 3.6   CHLORIDE mmol/L 99   CO2 mmol/L 31.0*   BUN mg/dL 15   CREATININE mg/dL 0.80   CALCIUM mg/dL 9.5    BILIRUBIN mg/dL 0.6   ALK PHOS U/L 100   ALT (SGPT) U/L 34   AST (SGOT) U/L 47*   GLUCOSE mg/dL 104*       Results from last 7 days  Lab Units 07/08/18  1608   WBC 10*3/mm3 6.54   HEMOGLOBIN g/dL 11.9*   HEMATOCRIT % 36.9*   PLATELETS 10*3/mm3 181                Pertinent Radiology Results:    Imaging Results (all)     Procedure Component Value Units Date/Time    XR Chest 2 View [[id number]] Collected:  07/08/18 1520     Updated:  07/08/18 1535    Narrative:       PROCEDURE: XR CHEST 2 VW-        HISTORY: allergic rxn     COMPARISON: None.     FINDINGS: The heart is normal in size. The mediastinum is unremarkable.  The lungs are clear. There is no pneumothorax. There are no acute  osseous abnormalities. There is loss of disc space height and anterior  osteophyte formation throughout the thoracic spine.       Impression:       No acute cardiopulmonary process.           This report was finalized on 7/8/2018 3:20 PM by Tiffanie Jiménez M.D..          Condition on Discharge:    Stable        Discharge Disposition:        Discharge Medication:       Discharge Medications      New Medications      Instructions Start Date   amLODIPine 5 MG tablet  Commonly known as:  NORVASC   5 mg, Oral, Every 24 Hours Scheduled   Start Date:  7/10/2018     aspirin 81 MG chewable tablet   81 mg, Oral, Daily   Start Date:  7/10/2018     atorvastatin 20 MG tablet  Commonly known as:  LIPITOR   20 mg, Oral, Nightly      losartan 50 MG tablet  Commonly known as:  COZAAR   50 mg, Oral, Every 24 Hours Scheduled   Start Date:  7/10/2018     metoprolol succinate XL 25 MG 24 hr tablet  Commonly known as:  TOPROL-XL   25 mg, Oral, Every 24 Hours Scheduled   Start Date:  7/10/2018        Continue These Medications      Instructions Start Date   ALLERGY SERUM INJECTION   Subcutaneous, Once      levocetirizine 5 MG tablet  Commonly known as:  XYZAL   5 mg, Oral, Every Evening      multivitamin with minerals tablet tablet   1 tablet, Oral, Daily       omeprazole 20 MG capsule  Commonly known as:  priLOSEC   20 mg, Oral, Daily         Stop These Medications    amoxicillin-clavulanate 875-125 MG per tablet  Commonly known as:  AUGMENTIN     lisinopril 20 MG tablet  Commonly known as:  PRINIVIL,ZESTRIL     losartan-hydrochlorothiazide 100-25 MG per tablet  Commonly known as:  HYZAAR     naproxen sodium 220 MG tablet  Commonly known as:  ALEVE     phenylephrine-promethazine-codeine 5-6.25-10 MG/5ML syrup syrup  Commonly known as:  PHENERGAN VC with CODEINE            Discharge Diet:     Diet Instructions     Diet: Consistent Carbohydrate, Cardiac; Thin       Discharge Diet:   Consistent Carbohydrate  Cardiac       Fluid Consistency:  Thin          Activity at Discharge:     Activity Instructions     Discharge Activity             Follow-up Appointments:  Follow up PCP in 1 week  Follow up with Dr. Tidwell 1 month  Check blood sugar twice daily before breakfast and at bedtime.  Record and take to PCP in 2-4 weeks to follow up with log of BS results  Monitor BP at home daily and record and take to PCP with results.               Graciela Finn, JENNIFER  07/09/18  1:18 PM

## 2018-07-09 NOTE — CONSULTS
Referring Provider: Dr Beltran   Reason for Consultation: Non q MI     Patient Care Team:  Amaury Moses MD as PCP - General    History of present illness: Middle-aged lady who has multiple issues with respect to allergies yesterday was playing house with something nail thought she had forgotten about a room and went back to the second time when she got exposed to fumes felt a choking sensation in the neck and had severe difficulty breathing.  This lasted for about an hour to 2 hours.  Pain telemetry she got to the emergency room at the emergency room patient was almost feeling better when her initial workup was done.    Since she's been in the hospital she's been doing reasonably well.  Has not had this symptoms again.    She is not functionally very active usually does ride morning of the long.  Has never done any exercise on a regular basis.  Has significant problems with respect to breathing that she is exposed to multiple allergens.  Takes multiple allergy shots on a regular basis.    Review of Systems   Pertinent items are noted in HPI  Review of Systems      History  1 Baseline EKG sinus rhythm OR interval of 194 ms rate of 60 beats a minute nonspecific ST wave changes.    #2 LV function assessment: Pending.    #3 CAD workup: None.    #4 obesity.    #5 hypertension.  Her pressure stays high.    #6 dyslipidemia.    #7 postmenopausal status.    #8 multiple allergies requiring allergy shots.    #9 arthritis.    #10    Personal history:    Nonsmoker does not drink alcohol does not appear struck function status of the patient has been reasonable.    Family history noncontributory has a brother who has not had CAD both parents have had CAD.    Review of symptoms:    There is no cough cold fever chills nausea vomiting diarrhea no abdominal pain loss of consciousness PND orthopnea stroke weakness joint swelling rest of the review of symptoms are negative.      Past Surgical History:   Procedure Laterality Date   •   SECTION     , History reviewed. No pertinent family history., Social History   Substance Use Topics   • Smoking status: Never Smoker   • Smokeless tobacco: Not on file   • Alcohol use No   , Prescriptions Prior to Admission   Medication Sig Dispense Refill Last Dose   • ALLERGY SERUM INJECTION Inject  under the skin 1 (One) Time.   Past Week at Unknown time   • levocetirizine (XYZAL) 5 MG tablet Take 5 mg by mouth Every Evening.   2018 at Unknown time   • lisinopril (PRINIVIL,ZESTRIL) 20 MG tablet Take 20 mg by mouth Daily.   Past Month at Unknown time   • losartan-hydrochlorothiazide (HYZAAR) 100-25 MG per tablet Take 1 tablet by mouth Daily.   2018 at Unknown time   • Multiple Vitamins-Minerals (MULTIVITAMIN WITH MINERALS) tablet tablet Take 1 tablet by mouth Daily.   2018 at Unknown time   • naproxen sodium (ALEVE) 220 MG tablet Take 220 mg by mouth 2 (Two) Times a Day As Needed.   2018 at Unknown time   • omeprazole (priLOSEC) 20 MG capsule Take 20 mg by mouth Daily.   2018 at Unknown time   • amoxicillin-clavulanate (AUGMENTIN) 875-125 MG per tablet Take 1 tablet by mouth 2 (Two) Times a Day. 20 tablet 0 More than a month at Unknown time   • phenylephrine-promethazine-codeine (PHENERGAN VC with CODEINE) 5-6.25-10 MG/5ML syrup syrup Take 5 mL by mouth Every 4 (Four) Hours As Needed (no driving or operating machines while using this medication). 118 mL 0 More than a month at Unknown time   , Scheduled Meds:    amLODIPine 2.5 mg Oral Q24H   aspirin 81 mg Oral Daily   atorvastatin 80 mg Oral Nightly   enoxaparin 40 mg Subcutaneous Q24H   lisinopril 20 mg Oral Q24H   metoprolol succinate XL 50 mg Oral Q24H   pantoprazole 40 mg Oral QAM   , Continuous Infusions:   , PRN Meds:  ipratropium-albuterol  •  nitroglycerin  •  sodium chloride  •  Insert peripheral IV **AND** sodium chloride, Allergies:  Patient has no known allergies.     Objective     Vital Sign Min/Max for last 24 hours  Temp   "Min: 97.8 °F (36.6 °C)  Max: 98.4 °F (36.9 °C)   BP  Min: 137/73  Max: 176/80   Pulse  Min: 82  Max: 105   Resp  Min: 18  Max: 20   SpO2  Min: 90 %  Max: 96 %   No Data Recorded   Weight  Min: 83.9 kg (185 lb)  Max: 114 kg (251 lb 5.2 oz)     Flowsheet Rows      First Filed Value   Admission Height  160 cm (63\") Documented at 07/08/2018 1501   Admission Weight  83.9 kg (185 lb) Documented at 07/08/2018 1501             Physical Exam:     General Appearance:    Alert, cooperative, in no acute distress   Head:    Normocephalic, without obvious abnormality, atraumatic   Eyes:            Lids and lashes normal, conjunctivae and sclerae normal, no   icterus, no pallor, corneas clear, PERRLA   Ears:    Ears appear intact with no abnormalities noted   Throat:   No oral lesions, no thrush, oral mucosa moist   Neck:   No adenopathy, supple, trachea midline, no thyromegaly, no     carotid bruit, no JVD   Back:     No kyphosis present, no scoliosis present, no skin lesions,       erythema or scars, no tenderness to percussion or                   palpation,   range of motion normal   Lungs:     Clear to auscultation,respirations regular, even and                   unlabored    Heart:    Regular rhythm and normal rate, normal S1 and S2, no            murmur, no gallop, no rub, no click   Breast Exam:    Deferred   Abdomen:     Normal bowel sounds, no masses, no organomegaly, soft        non-tender, non-distended, no guarding, no rebound                 tenderness   Genitalia:    Deferred   Extremities:   Moves all extremities well, no edema, no cyanosis, no              redness   Pulses:   Pulses palpable and equal bilaterally   Skin:   No bleeding, bruising or rash   Lymph nodes:   No palpable adenopathy   Neurologic:   Cranial nerves 2 - 12 grossly intact, sensation intact, DTR        present and equal bilaterally       Results Review:   I reviewed the patient's new clinical results.    EKG: Sinus rhythm SD interval of 100 " and 94 Ms Rate of 59 Beats a Minute Early Repolarization Changes Noted.  Nonspecific ST Changes.    LAB DATA :       Results from last 7 days  Lab Units 07/08/18  2208   CHOLESTEROL mg/dL 197   TRIGLYCERIDES mg/dL 88   HDL CHOL mg/dL 51   LDL CHOL mg/dL 128*       WBC   Date Value Ref Range Status   07/08/2018 6.54 4.80 - 10.80 10*3/mm3 Final     RBC   Date Value Ref Range Status   07/08/2018 3.99 (L) 4.20 - 5.40 10*6/mm3 Final     Hemoglobin   Date Value Ref Range Status   07/08/2018 11.9 (L) 12.0 - 16.0 g/dL Final     Hematocrit   Date Value Ref Range Status   07/08/2018 36.9 (L) 37.0 - 47.0 % Final     MCV   Date Value Ref Range Status   07/08/2018 92.5 81.0 - 99.0 fL Final     MCH   Date Value Ref Range Status   07/08/2018 29.8 27.0 - 31.0 pg Final     MCHC   Date Value Ref Range Status   07/08/2018 32.2 30.0 - 37.0 g/dL Final     RDW   Date Value Ref Range Status   07/08/2018 14.3 11.5 - 14.5 % Final     RDW-SD   Date Value Ref Range Status   07/08/2018 49.1 37.0 - 54.0 fl Final     MPV   Date Value Ref Range Status   07/08/2018 10.5 6.0 - 12.0 fL Final     Platelets   Date Value Ref Range Status   07/08/2018 181 130 - 400 10*3/mm3 Final     Neutrophil %   Date Value Ref Range Status   07/08/2018 55.4 37.0 - 80.0 % Final     Lymphocyte %   Date Value Ref Range Status   07/08/2018 28.1 10.0 - 50.0 % Final     Monocyte %   Date Value Ref Range Status   07/08/2018 13.0 (H) 0.0 - 12.0 % Final     Eosinophil %   Date Value Ref Range Status   07/08/2018 2.4 0.0 - 7.0 % Final     Basophil %   Date Value Ref Range Status   07/08/2018 0.8 0.0 - 2.5 % Final     Immature Grans %   Date Value Ref Range Status   07/08/2018 0.3 0.0 - 0.6 % Final     Neutrophils, Absolute   Date Value Ref Range Status   07/08/2018 3.62 2.00 - 6.90 10*3/mm3 Final     Lymphocytes, Absolute   Date Value Ref Range Status   07/08/2018 1.84 0.60 - 3.40 10*3/mm3 Final     Monocytes, Absolute   Date Value Ref Range Status   07/08/2018 0.85 0.00 -  0.90 10*3/mm3 Final     Eosinophils, Absolute   Date Value Ref Range Status   07/08/2018 0.16 0.00 - 0.70 10*3/mm3 Final     Basophils, Absolute   Date Value Ref Range Status   07/08/2018 0.05 0.00 - 0.20 10*3/mm3 Final     Immature Grans, Absolute   Date Value Ref Range Status   07/08/2018 0.02 0.00 - 0.06 10*3/mm3 Final     nRBC   Date Value Ref Range Status   07/08/2018 0.0 0.0 - 0.0 /100 WBC Final       Lab Results   Component Value Date    GLUCOSE 104 (H) 07/08/2018    BUN 15 07/08/2018    CREATININE 0.80 07/08/2018    EGFRIFNONA 71 07/08/2018    BCR 18.8 07/08/2018    CO2 31.0 (H) 07/08/2018    CALCIUM 9.5 07/08/2018    ALBUMIN 3.90 07/08/2018    LABIL2 1.2 07/08/2018    AST 47 (H) 07/08/2018    ALT 34 07/08/2018       Lab Results   Component Value Date    TROPONINI 0.549 (C) 07/09/2018       No results found for: DDIMER    No results found for: SITE, ALLENTEST, PHART, OKI0YJZ, PO2ART, VUF1NXZ, BASEEXCESS, K7JPVMWI, HGBBG, HCTABG, OXYHEMOGLOBI, METHHGBN, CARBOXYHGB, CO2CT, BAROMETRIC, MODALITY, FIO2  Lab Results   Component Value Date    HGBA1C 6.3 (H) 07/08/2018       Results from last 7 days  Lab Units 07/08/18  2208   TSH mIU/mL 0.796   FREE T4 ng/dL 1.28     No results found for: LIPASE    IMAGING DATA:     Xr Chest 2 View    Result Date: 7/8/2018  Narrative: PROCEDURE: XR CHEST 2 VW-    HISTORY: allergic rxn  COMPARISON: None.  FINDINGS: The heart is normal in size. The mediastinum is unremarkable. The lungs are clear. There is no pneumothorax. There are no acute osseous abnormalities. There is loss of disc space height and anterior osteophyte formation throughout the thoracic spine.      Impression: No acute cardiopulmonary process.    This report was finalized on 7/8/2018 3:20 PM by Tiffanie Jiménez M.D..        DIAGNOSIS   1 non-Q-wave myocardial infarction: Patient has presented with atypical symptoms has intermediate risk profile for CAD has persistently elevating cardiac enzymes.  She has been  offered invasive workup for evaluation of the coronary anatomy and intervention as needed.  She would be a poor candidate for any stress test given her body habitus and her functional status.  Patient is in agreement with this plan.  We will arrange for the same.  Risks and benefits have been explained.    #2 hypertension: Blood pressure seemed to be significantly elevated.  This partly may be related to her avoidance of beta blocker therapy.  We will keep her on amlodipine with maximized dose of ACE inhibitor and diuretic therapy.  If continued issues might need beta blocker therapy to be initiated.    #3 obesity: Weight loss might have to be pursued once all this workup is complete.    #4 risk profile: Blood work will be obtained to evaluate for the same and therapy optimized for the same.    Thank you again for ready me take part in the care of Mrs. Hardy with the permission we'll continue to follow her during the hospital stay.        Active Problems:    Elevated troponin          I discussed the patients findings and my recommendations with patient    Srinivas Tidwell MD  07/09/18  6:40 AM      Please note that portions of this note may have been completed with a voice recognition program. Efforts were made to edit the dictations, but occasionally words are mistranscribed.

## 2018-07-09 NOTE — PLAN OF CARE
Problem: Fall Risk (Adult)  Goal: Identify Related Risk Factors and Signs and Symptoms   07/08/18 2002   Fall Risk (Adult)   Related Risk Factors (Fall Risk) age-related changes;fatigue/slow reaction   Signs and Symptoms (Fall Risk) presence of risk factors     Goal: Absence of Fall   07/08/18 2002   Fall Risk (Adult)   Absence of Fall making progress toward outcome

## 2018-07-19 ENCOUNTER — TRANSCRIBE ORDERS (OUTPATIENT)
Dept: ADMINISTRATIVE | Facility: HOSPITAL | Age: 68
End: 2018-07-19

## 2018-07-19 ENCOUNTER — HOSPITAL ENCOUNTER (OUTPATIENT)
Dept: ULTRASOUND IMAGING | Facility: HOSPITAL | Age: 68
Discharge: HOME | End: 2018-07-19
Admitting: INTERNAL MEDICINE

## 2018-07-19 DIAGNOSIS — I82.4Z1 DVT, LOWER EXTREMITY, DISTAL, ACUTE, RIGHT (HCC): ICD-10-CM

## 2018-07-19 DIAGNOSIS — I82.4Z1 DVT, LOWER EXTREMITY, DISTAL, ACUTE, RIGHT (HCC): Primary | ICD-10-CM

## 2018-07-19 PROCEDURE — 93971 EXTREMITY STUDY: CPT

## 2018-07-20 ENCOUNTER — HOSPITAL ENCOUNTER (OUTPATIENT)
Dept: ULTRASOUND IMAGING | Facility: HOSPITAL | Age: 68
End: 2018-07-20
Attending: INTERNAL MEDICINE

## 2018-12-14 ENCOUNTER — TRANSCRIBE ORDERS (OUTPATIENT)
Dept: ADMINISTRATIVE | Facility: HOSPITAL | Age: 68
End: 2018-12-14

## 2018-12-14 DIAGNOSIS — Z12.31 ENCOUNTER FOR SCREENING MAMMOGRAM FOR MALIGNANT NEOPLASM OF BREAST: Primary | ICD-10-CM

## 2019-09-18 ENCOUNTER — TRANSCRIBE ORDERS (OUTPATIENT)
Dept: ADMINISTRATIVE | Facility: HOSPITAL | Age: 69
End: 2019-09-18

## 2019-09-18 DIAGNOSIS — N17.9 ACUTE RENAL FAILURE, UNSPECIFIED ACUTE RENAL FAILURE TYPE (HCC): Primary | ICD-10-CM

## 2019-09-25 ENCOUNTER — HOSPITAL ENCOUNTER (OUTPATIENT)
Dept: ULTRASOUND IMAGING | Facility: HOSPITAL | Age: 69
Discharge: HOME | End: 2019-09-25
Admitting: INTERNAL MEDICINE

## 2019-09-25 DIAGNOSIS — N17.9 ACUTE RENAL FAILURE, UNSPECIFIED ACUTE RENAL FAILURE TYPE (HCC): ICD-10-CM

## 2019-09-25 PROCEDURE — 76775 US EXAM ABDO BACK WALL LIM: CPT

## 2019-11-13 ENCOUNTER — TRANSCRIBE ORDERS (OUTPATIENT)
Dept: LAB | Facility: HOSPITAL | Age: 69
End: 2019-11-13

## 2019-11-13 ENCOUNTER — LAB (OUTPATIENT)
Dept: LAB | Facility: HOSPITAL | Age: 69
End: 2019-11-13

## 2019-11-13 DIAGNOSIS — N17.9 AKI (ACUTE KIDNEY INJURY) (HCC): ICD-10-CM

## 2019-11-13 DIAGNOSIS — D64.9 ANEMIA, UNSPECIFIED TYPE: Primary | ICD-10-CM

## 2019-11-13 DIAGNOSIS — D64.9 ANEMIA, UNSPECIFIED TYPE: ICD-10-CM

## 2019-11-13 DIAGNOSIS — R80.9 PROTEINURIA, UNSPECIFIED TYPE: ICD-10-CM

## 2019-11-13 LAB
ALBUMIN SERPL-MCNC: 3.8 G/DL (ref 3.5–5.2)
AMORPH URATE CRY URNS QL MICRO: ABNORMAL /HPF
ANION GAP SERPL CALCULATED.3IONS-SCNC: 12.7 MMOL/L (ref 5–15)
BACTERIA UR QL AUTO: ABNORMAL /HPF
BASOPHILS # BLD AUTO: 0.09 10*3/MM3 (ref 0–0.2)
BASOPHILS NFR BLD AUTO: 1.4 % (ref 0–1.5)
BILIRUB UR QL STRIP: NEGATIVE
BUN BLD-MCNC: 15 MG/DL (ref 8–23)
BUN/CREAT SERPL: 15.5 (ref 7–25)
CALCIUM SPEC-SCNC: 10 MG/DL (ref 8.6–10.5)
CHLORIDE SERPL-SCNC: 100 MMOL/L (ref 98–107)
CLARITY UR: ABNORMAL
CO2 SERPL-SCNC: 27.3 MMOL/L (ref 22–29)
COLOR UR: YELLOW
CREAT BLD-MCNC: 0.97 MG/DL (ref 0.57–1)
DEPRECATED RDW RBC AUTO: 54.2 FL (ref 37–54)
EOSINOPHIL # BLD AUTO: 0.18 10*3/MM3 (ref 0–0.4)
EOSINOPHIL NFR BLD AUTO: 2.9 % (ref 0.3–6.2)
ERYTHROCYTE [DISTWIDTH] IN BLOOD BY AUTOMATED COUNT: 18.8 % (ref 12.3–15.4)
FERRITIN SERPL-MCNC: 12.6 NG/ML (ref 13–150)
GFR SERPL CREATININE-BSD FRML MDRD: 57 ML/MIN/1.73
GLUCOSE BLD-MCNC: 133 MG/DL (ref 65–99)
GLUCOSE UR STRIP-MCNC: NEGATIVE MG/DL
HCT VFR BLD AUTO: 32.3 % (ref 34–46.6)
HGB BLD-MCNC: 9.7 G/DL (ref 12–15.9)
HGB UR QL STRIP.AUTO: ABNORMAL
HYALINE CASTS UR QL AUTO: ABNORMAL /LPF
IMM GRANULOCYTES # BLD AUTO: 0.01 10*3/MM3 (ref 0–0.05)
IMM GRANULOCYTES NFR BLD AUTO: 0.2 % (ref 0–0.5)
IRON 24H UR-MRATE: 27 MCG/DL (ref 37–145)
IRON SATN MFR SERPL: 6 % (ref 20–50)
KETONES UR QL STRIP: NEGATIVE
LEUKOCYTE ESTERASE UR QL STRIP.AUTO: ABNORMAL
LYMPHOCYTES # BLD AUTO: 1.77 10*3/MM3 (ref 0.7–3.1)
LYMPHOCYTES NFR BLD AUTO: 28.2 % (ref 19.6–45.3)
MCH RBC QN AUTO: 23.7 PG (ref 26.6–33)
MCHC RBC AUTO-ENTMCNC: 30 G/DL (ref 31.5–35.7)
MCV RBC AUTO: 78.8 FL (ref 79–97)
MONOCYTES # BLD AUTO: 0.56 10*3/MM3 (ref 0.1–0.9)
MONOCYTES NFR BLD AUTO: 8.9 % (ref 5–12)
NEUTROPHILS # BLD AUTO: 3.66 10*3/MM3 (ref 1.7–7)
NEUTROPHILS NFR BLD AUTO: 58.4 % (ref 42.7–76)
NITRITE UR QL STRIP: NEGATIVE
NRBC BLD AUTO-RTO: 0 /100 WBC (ref 0–0.2)
PH UR STRIP.AUTO: 5.5 [PH] (ref 5–8)
PHOSPHATE SERPL-MCNC: 2.4 MG/DL (ref 2.5–4.5)
PLATELET # BLD AUTO: 287 10*3/MM3 (ref 140–450)
PMV BLD AUTO: 10 FL (ref 6–12)
POTASSIUM BLD-SCNC: 4.6 MMOL/L (ref 3.5–5.2)
PROT UR QL STRIP: ABNORMAL
RBC # BLD AUTO: 4.1 10*6/MM3 (ref 3.77–5.28)
RBC # UR: ABNORMAL /HPF
REF LAB TEST METHOD: ABNORMAL
SODIUM BLD-SCNC: 140 MMOL/L (ref 136–145)
SP GR UR STRIP: 1.03 (ref 1–1.03)
SQUAMOUS #/AREA URNS HPF: ABNORMAL /HPF
TIBC SERPL-MCNC: 465 MCG/DL (ref 298–536)
TRANSFERRIN SERPL-MCNC: 312 MG/DL (ref 200–360)
UROBILINOGEN UR QL STRIP: ABNORMAL
WBC NRBC COR # BLD: 6.27 10*3/MM3 (ref 3.4–10.8)
WBC UR QL AUTO: ABNORMAL /HPF

## 2019-11-13 PROCEDURE — 84165 PROTEIN E-PHORESIS SERUM: CPT

## 2019-11-13 PROCEDURE — 81001 URINALYSIS AUTO W/SCOPE: CPT

## 2019-11-13 PROCEDURE — 84466 ASSAY OF TRANSFERRIN: CPT

## 2019-11-13 PROCEDURE — 86334 IMMUNOFIX E-PHORESIS SERUM: CPT

## 2019-11-13 PROCEDURE — 86335 IMMUNFIX E-PHORSIS/URINE/CSF: CPT

## 2019-11-13 PROCEDURE — 83540 ASSAY OF IRON: CPT

## 2019-11-13 PROCEDURE — 83883 ASSAY NEPHELOMETRY NOT SPEC: CPT

## 2019-11-13 PROCEDURE — 80069 RENAL FUNCTION PANEL: CPT

## 2019-11-13 PROCEDURE — 82784 ASSAY IGA/IGD/IGG/IGM EACH: CPT

## 2019-11-13 PROCEDURE — 85025 COMPLETE CBC W/AUTO DIFF WBC: CPT

## 2019-11-13 PROCEDURE — 82728 ASSAY OF FERRITIN: CPT

## 2019-11-13 PROCEDURE — 84155 ASSAY OF PROTEIN SERUM: CPT

## 2019-11-13 PROCEDURE — 36415 COLL VENOUS BLD VENIPUNCTURE: CPT

## 2019-11-14 LAB
ALBUMIN SERPL-MCNC: 3.1 G/DL (ref 2.9–4.4)
ALBUMIN/GLOB SERPL: 0.8 {RATIO} (ref 0.7–1.7)
ALPHA1 GLOB FLD ELPH-MCNC: 0.3 G/DL (ref 0–0.4)
ALPHA2 GLOB SERPL ELPH-MCNC: 0.9 G/DL (ref 0.4–1)
B-GLOBULIN SERPL ELPH-MCNC: 1.5 G/DL (ref 0.7–1.3)
GAMMA GLOB SERPL ELPH-MCNC: 1.1 G/DL (ref 0.4–1.8)
GLOBULIN SER CALC-MCNC: 3.9 G/DL (ref 2.2–3.9)
IGA SERPL-MCNC: 362 MG/DL (ref 87–352)
IGG SERPL-MCNC: 1164 MG/DL (ref 700–1600)
IGM SERPL-MCNC: 119 MG/DL (ref 26–217)
INTERPRETATION SERPL IEP-IMP: ABNORMAL
KAPPA LC SERPL-MCNC: 22.5 MG/L (ref 3.3–19.4)
KAPPA LC/LAMBDA SER: 1.06 {RATIO} (ref 0.26–1.65)
LAMBDA LC FREE SERPL-MCNC: 21.2 MG/L (ref 5.7–26.3)
Lab: ABNORMAL
M-SPIKE: ABNORMAL G/DL
PROT SERPL-MCNC: 7 G/DL (ref 6–8.5)

## 2019-11-15 LAB — INTERPRETATION UR IFE-IMP: NORMAL

## 2019-11-21 ENCOUNTER — TRANSCRIBE ORDERS (OUTPATIENT)
Dept: ADMINISTRATIVE | Facility: HOSPITAL | Age: 69
End: 2019-11-21

## 2019-11-21 DIAGNOSIS — N28.89 RENAL MASS: Primary | ICD-10-CM

## 2019-11-29 ENCOUNTER — HOSPITAL ENCOUNTER (OUTPATIENT)
Dept: CT IMAGING | Facility: HOSPITAL | Age: 69
Discharge: HOME | End: 2019-11-29
Admitting: INTERNAL MEDICINE

## 2019-11-29 DIAGNOSIS — N28.89 RENAL MASS: ICD-10-CM

## 2019-11-29 PROCEDURE — 74170 CT ABD WO CNTRST FLWD CNTRST: CPT

## 2019-11-29 PROCEDURE — 25010000002 IOPAMIDOL 61 % SOLUTION: Performed by: INTERNAL MEDICINE

## 2019-11-29 RX ADMIN — IOPAMIDOL 100 ML: 612 INJECTION, SOLUTION INTRAVENOUS at 09:33

## 2021-03-17 ENCOUNTER — APPOINTMENT (OUTPATIENT)
Dept: LAB | Facility: HOSPITAL | Age: 71
End: 2021-03-17

## 2021-03-19 ENCOUNTER — OFFICE VISIT (OUTPATIENT)
Dept: GASTROENTEROLOGY | Facility: CLINIC | Age: 71
End: 2021-03-19

## 2021-03-19 ENCOUNTER — PREP FOR SURGERY (OUTPATIENT)
Dept: OTHER | Facility: HOSPITAL | Age: 71
End: 2021-03-19

## 2021-03-19 VITALS
SYSTOLIC BLOOD PRESSURE: 160 MMHG | HEIGHT: 61 IN | OXYGEN SATURATION: 98 % | HEART RATE: 120 BPM | TEMPERATURE: 97.1 F | DIASTOLIC BLOOD PRESSURE: 50 MMHG | BODY MASS INDEX: 42.44 KG/M2 | WEIGHT: 224.8 LBS

## 2021-03-19 DIAGNOSIS — K92.2 GASTROINTESTINAL HEMORRHAGE, UNSPECIFIED GASTROINTESTINAL HEMORRHAGE TYPE: ICD-10-CM

## 2021-03-19 DIAGNOSIS — D64.9 ANEMIA, UNSPECIFIED TYPE: Primary | ICD-10-CM

## 2021-03-19 DIAGNOSIS — I10 ESSENTIAL HYPERTENSION: ICD-10-CM

## 2021-03-19 DIAGNOSIS — D50.0 IRON DEFICIENCY ANEMIA DUE TO CHRONIC BLOOD LOSS: Primary | ICD-10-CM

## 2021-03-19 DIAGNOSIS — T88.59XD COMPLICATION OF ANESTHESIA, SUBSEQUENT ENCOUNTER: ICD-10-CM

## 2021-03-19 PROBLEM — R79.89 ELEVATED TROPONIN: Status: RESOLVED | Noted: 2018-07-08 | Resolved: 2021-03-19

## 2021-03-19 PROBLEM — R77.8 ELEVATED TROPONIN: Status: RESOLVED | Noted: 2018-07-08 | Resolved: 2021-03-19

## 2021-03-19 PROCEDURE — 99214 OFFICE O/P EST MOD 30 MIN: CPT | Performed by: NURSE PRACTITIONER

## 2021-03-19 RX ORDER — BUDESONIDE AND FORMOTEROL FUMARATE DIHYDRATE 160; 4.5 UG/1; UG/1
AEROSOL RESPIRATORY (INHALATION)
COMMUNITY

## 2021-03-19 RX ORDER — BUDESONIDE AND FORMOTEROL FUMARATE DIHYDRATE 160; 4.5 UG/1; UG/1
AEROSOL RESPIRATORY (INHALATION)
COMMUNITY
End: 2021-03-19 | Stop reason: ALTCHOICE

## 2021-03-19 RX ORDER — AZELASTINE 1 MG/ML
SPRAY, METERED NASAL AS NEEDED
COMMUNITY

## 2021-03-19 RX ORDER — LANCETS
EACH MISCELLANEOUS
COMMUNITY

## 2021-03-19 RX ORDER — ALBUTEROL SULFATE 2.5 MG/3ML
SOLUTION RESPIRATORY (INHALATION)
COMMUNITY

## 2021-03-19 RX ORDER — GENTAMICIN SULFATE 3 MG/G
OINTMENT OPHTHALMIC
COMMUNITY
End: 2021-03-19

## 2021-03-19 RX ORDER — LISINOPRIL AND HYDROCHLOROTHIAZIDE 20; 12.5 MG/1; MG/1
TABLET ORAL
COMMUNITY
End: 2021-03-19

## 2021-03-19 RX ORDER — PANTOPRAZOLE SODIUM 40 MG/1
TABLET, DELAYED RELEASE ORAL
Qty: 60 TABLET | Refills: 3 | Status: SHIPPED | OUTPATIENT
Start: 2021-03-19 | End: 2021-12-06

## 2021-03-19 RX ORDER — ALBUTEROL SULFATE 90 UG/1
AEROSOL, METERED RESPIRATORY (INHALATION)
COMMUNITY

## 2021-03-19 RX ORDER — FLUTICASONE PROPIONATE 50 MCG
SPRAY, SUSPENSION (ML) NASAL AS NEEDED
COMMUNITY

## 2021-03-19 RX ORDER — LOSARTAN POTASSIUM AND HYDROCHLOROTHIAZIDE 25; 100 MG/1; MG/1
TABLET ORAL
COMMUNITY
End: 2021-03-19

## 2021-03-19 RX ORDER — FAMOTIDINE 40 MG/1
TABLET, FILM COATED ORAL
COMMUNITY
End: 2021-03-19

## 2021-03-19 RX ORDER — OMEPRAZOLE 40 MG/1
CAPSULE, DELAYED RELEASE ORAL DAILY
COMMUNITY
End: 2021-03-19

## 2021-03-19 RX ORDER — EPINEPHRINE 0.3 MG/.3ML
INJECTION SUBCUTANEOUS
COMMUNITY

## 2021-03-19 RX ORDER — BETAMETHASONE DIPROPIONATE 0.5 MG/G
LOTION TOPICAL
COMMUNITY
End: 2021-03-19

## 2021-03-19 NOTE — PROGRESS NOTES
New Patient Consultation     Patient Name: Steven Hardy  : 1950   MRN: [id number]     Chief Complaint:    Chief Complaint   Patient presents with   • Anemia       History of Present Illness: Steven Hardy is a 71 y.o. female who is here today for a Gastroenterology Consultation for anemia.  This was newly discovered on routine lab work through PCP.  She denies any history of NSAID or alcohol use.  She has never had H. pylori.  She occasionally has acid reflux but manages this with avoidance of spicy foods and has not felt this for the past 2 months.  She is followed by ENT for hoarseness and multiple allergies.  She reports he mentioned her esophagus looked irritated on scope.  She is on omeprazole once daily.  She denies any hematochezia, abd pain, or melena.  She has never had a colon cancer screening.  She has no family history of colon cancer.  She has no history of GI bleeds.  She denies any uterine bleeding, easy bruising/bleeding, rash, hematuria.          She does have a history of C-sections which resulted in severe anesthesia reaction.  Knows that CPR was performed.  She has had allergy testing through the [facility] which shows allergies to lidocaine, cobalt, nickel, and quaternium 15 (must avoid all canned food/ medications).        Subjective      Review of Systems:   Review of Systems   Constitutional: Negative for appetite change and unexpected weight loss.   HENT: Positive for voice change. Negative for trouble swallowing.    Cardiovascular: Negative for chest pain.   Gastrointestinal: Negative for abdominal distention, abdominal pain, anal bleeding, blood in stool, constipation, diarrhea, nausea, rectal pain, vomiting, GERD and indigestion.   Genitourinary: Negative for hematuria and vaginal bleeding.   Musculoskeletal: Positive for arthralgias.   Skin: Negative for color change, pallor, rash and bruise.   Allergic/Immunologic: Positive for environmental allergies  and food allergies.   Hematological: Negative for adenopathy.       Past Medical History:   Past Medical History:   Diagnosis Date   • Elevated troponin 2018   • GERD (gastroesophageal reflux disease)    • H/O seasonal allergies    • Hypertension        Past Surgical History:   Past Surgical History:   Procedure Laterality Date   • CARDIAC CATHETERIZATION N/A 2018    Procedure: Left Heart Cath;  Surgeon: Srinivas Tidwell MD;  Location: [facility] INVASIVE LOCATION;  Service: Cardiovascular   •  SECTION         Family History:   Family History   Problem Relation Age of Onset   • Ulcers Mother    • Anemia Mother        Social History:   Social History     Socioeconomic History   • Marital status: Single     Spouse name: Not on file   • Number of children: Not on file   • Years of education: Not on file   • Highest education level: Not on file   Tobacco Use   • Smoking status: Never Smoker   • Smokeless tobacco: Never Used   Substance and Sexual Activity   • Alcohol use: No   • Drug use: No       Alcohol/Tobacco History:   Social History     Substance and Sexual Activity   Alcohol Use No     Social History     Tobacco Use   Smoking Status Never Smoker   Smokeless Tobacco Never Used       Medications:     Current Outpatient Medications:   •  ACCU-CHEK SOFTCLIX LANCETS lancets, Use to check blood glucose twice daily, with a meal and at bedtime, Disp: 100 each, Rfl: 0  •  Accu-Chek Softclix Lancets lancets, Accu-Chek Softclix Lancets, Disp: , Rfl:   •  albuterol (PROVENTIL) (2.5 MG/3ML) 0.083% nebulizer solution, albuterol sulfate 2.5 mg/3 mL (0.083 %) solution for nebulization  use every 6-8 hours PRN wheezing, Disp: , Rfl:   •  albuterol sulfate HFA (Ventolin HFA) 108 (90 Base) MCG/ACT inhaler, Ventolin HFA 90 mcg/actuation aerosol inhaler  INHALE 1 OR 2 PUFFS BY MOUTH EVERY 4 TO 6 HOURS AS NEEDED FOR SHORTNESS OF BREATH, Disp: , Rfl:   •  ALLERGY SERUM INJECTION, Inject  under the skin 1 (One)  "Time., Disp: , Rfl:   •  aspirin 81 MG chewable tablet, Chew 1 tablet Daily., Disp: 30 tablet, Rfl: 0  •  atorvastatin (LIPITOR) 20 MG tablet, Take 1 tablet by mouth Every Night., Disp: 30 tablet, Rfl: 0  •  azelastine (ASTELIN) 0.1 % nasal spray, azelastine 137 mcg (0.1 %) nasal spray aerosol, Disp: , Rfl:   •  Blood Glucose Monitoring Suppl (ACCU-CHEK KRYS PLUS) w/Device kit, Use to check blood glucose twice daily, before a meal and at bedtime, Disp: 1 kit, Rfl: 0  •  budesonide-formoterol (Symbicort) 160-4.5 MCG/ACT inhaler, Symbicort 160 mcg-4.5 mcg/actuation HFA aerosol inhaler, Disp: , Rfl:   •  EPINEPHrine (EPIPEN) 0.3 MG/0.3ML solution auto-injector injection, epinephrine 0.3 mg/0.3 mL injection, auto-injector, Disp: , Rfl:   •  fluticasone (FLONASE) 50 MCG/ACT nasal spray, fluticasone propionate 50 mcg/actuation nasal spray,suspension, Disp: , Rfl:   •  glucose blood test strip, Accu-Chek Krys Plus test strips, Disp: , Rfl:   •  losartan (COZAAR) 50 MG tablet, Take 1 tablet by mouth Daily., Disp: 30 tablet, Rfl: 0  •  Multiple Vitamins-Minerals (MULTIVITAMIN WITH MINERALS) tablet tablet, Take 1 tablet by mouth Daily., Disp: , Rfl:   •  pantoprazole (PROTONIX) 40 MG EC tablet, Twice a day for 4 weeks, than once daily, Disp: 60 tablet, Rfl: 3    Allergies:   Allergies   Allergen Reactions   • Lidocaine Unknown - High Severity   • Quaternium-15 Unknown - High Severity   • Adhesive Tape Rash   • Latex Rash   • Nickel Rash       Objective     Physical Exam:  Vital Signs:   Vitals:    03/19/21 0914   BP: 160/50   BP Location: Left arm   Patient Position: Sitting   Cuff Size: Large Adult   Pulse: 120   Temp: 97.1 °F (36.2 °C)   TempSrc: Temporal   SpO2: 98%   Weight: 102 kg (224 lb 12.8 oz)   Height: 154.9 cm (61\")     Body mass index is 42.48 kg/m².     Physical Exam  Vitals and nursing note reviewed.   Constitutional:       General: She is not in acute distress.     Appearance: She is well-developed. She is " obese. She is not diaphoretic.   Eyes:      General: No scleral icterus.     Extraocular Movements:      Right eye: No nystagmus.      Left eye: No nystagmus.      Conjunctiva/sclera: Conjunctivae normal.      Pupils: Pupils are equal, round, and reactive to light.   Neck:      Thyroid: No thyromegaly.   Cardiovascular:      Rate and Rhythm: Normal rate and regular rhythm.   Pulmonary:      Effort: Pulmonary effort is normal.      Breath sounds: Normal breath sounds. No wheezing or rales.   Abdominal:      General: Bowel sounds are normal. There is no distension. There are no signs of injury.      Palpations: Abdomen is soft. There is no hepatomegaly or splenomegaly.      Tenderness: There is no abdominal tenderness.      Hernia: No hernia is present.   Musculoskeletal:      Cervical back: Neck supple.      Right lower leg: No edema.      Left lower leg: No edema.   Skin:     General: Skin is warm and dry.      Capillary Refill: Capillary refill takes 2 to 3 seconds.      Coloration: Skin is not jaundiced or pale.      Findings: No bruising, lesion, petechiae or rash.      Nails: There is no clubbing.   Neurological:      Mental Status: She is alert and oriented to person, place, and time.      Motor: No weakness.   Psychiatric:         Behavior: Behavior normal.         Thought Content: Thought content normal.         Judgment: Judgment normal.         Assessment / Plan      Assessment/Plan:   Diagnoses and all orders for this visit:    1. Iron deficiency anemia due to chronic blood loss (Primary)  -     CBC & Differential; Future  -     Peripheral Blood Smear; Future  -     Iron Profile; Future  -     H. Pylori Breath Test - Breath, Lung; Future  -     Celiac Comprehensive Panel; Future  -     Comprehensive Metabolic Panel; Future  -     Ambulatory referral for Screening EGD  -     Ambulatory Referral For Screening Colonoscopy  -     Comprehensive Metabolic Panel  -     Celiac Comprehensive Panel  -     Iron  Profile  -     CBC & Differential  -     H. Pylori Breath Test - Breath, Lung  -     Peripheral Blood Smear  Suspected GI bleed, repeat labs today and schedule for EGD colonoscopy.  Patient with prior history of severe reaction to anesthesia.  She will bring her allergy list to her endoscopy.  Allergies added to allergy list.  Patient will be scheduled in hospital for closer monitoring  2. Complication of anesthesia, subsequent encounter  -     Ambulatory referral for Screening EGD  -     Ambulatory Referral For Screening Colonoscopy    3. Gastrointestinal hemorrhage, unspecified gastrointestinal hemorrhage type  -     pantoprazole (PROTONIX) 40 MG EC tablet; Twice a day for 4 weeks, than once daily  Dispense: 60 tablet; Refill: 3  Discontinue omeprazole and start pantoprazole twice daily.  Advised should she develop hematochezia, hematemesis, melena-to report to ED  4. Essential hypertension  Patient reports anxiety at doctor's office, continue with management through PCP         Follow Up:   Return if symptoms worsen or fail to improve.    Plan of care reviewed with the patient at the conclusion of today's visit.  Education was provided regarding diagnosis, management, and any prescribed or recommended OTC medications.  Patient verbalized understanding of and agreement with management plan.         JENNIFER Eaton  [facility]     Please note that portions of this note may have been completed with a voice recognition program. Efforts were made to edit the dictations, but occasionally words are mistranscribed.

## 2021-03-23 LAB
ALBUMIN SERPL-MCNC: 4.1 G/DL (ref 3.5–5.2)
ALBUMIN/GLOB SERPL: 1.3 G/DL
ALP SERPL-CCNC: 123 U/L (ref 39–117)
ALT SERPL-CCNC: 12 U/L (ref 1–33)
AST SERPL-CCNC: 20 U/L (ref 1–32)
BASOPHILS # BLD AUTO: 0.1 X10E3/UL (ref 0–0.2)
BASOPHILS NFR BLD AUTO: 2 %
BILIRUB SERPL-MCNC: 0.2 MG/DL (ref 0–1.2)
BUN SERPL-MCNC: 15 MG/DL (ref 8–23)
BUN/CREAT SERPL: 15.2 (ref 7–25)
CALCIUM SERPL-MCNC: 9.7 MG/DL (ref 8.6–10.5)
CHLORIDE SERPL-SCNC: 104 MMOL/L (ref 98–107)
CO2 SERPL-SCNC: 26.5 MMOL/L (ref 22–29)
CREAT SERPL-MCNC: 0.99 MG/DL (ref 0.57–1)
ENDOMYSIUM IGA SER QL: NEGATIVE
EOSINOPHIL # BLD AUTO: 0.2 X10E3/UL (ref 0–0.4)
EOSINOPHIL NFR BLD AUTO: 3 %
ERYTHROCYTE [DISTWIDTH] IN BLOOD BY AUTOMATED COUNT: 20 % (ref 11.7–15.4)
GLIADIN PEPTIDE IGA SER-ACNC: 8 UNITS (ref 0–19)
GLIADIN PEPTIDE IGG SER-ACNC: 3 UNITS (ref 0–19)
GLOBULIN SER CALC-MCNC: 3.2 GM/DL
GLUCOSE SERPL-MCNC: 132 MG/DL (ref 65–99)
HCT VFR BLD AUTO: 25.9 % (ref 34–46.6)
HGB BLD-MCNC: 6.9 G/DL (ref 11.1–15.9)
IGA SERPL-MCNC: 401 MG/DL (ref 64–422)
IMM GRANULOCYTES # BLD AUTO: 0 X10E3/UL (ref 0–0.1)
IMM GRANULOCYTES NFR BLD AUTO: 0 %
IRON SATN MFR SERPL: 2 % (ref 20–50)
IRON SERPL-MCNC: 12 MCG/DL (ref 37–145)
LYMPHOCYTES # BLD AUTO: 2.1 X10E3/UL (ref 0.7–3.1)
LYMPHOCYTES NFR BLD AUTO: 28 %
MCH RBC QN AUTO: 18.8 PG (ref 26.6–33)
MCHC RBC AUTO-ENTMCNC: 26.6 G/DL (ref 31.5–35.7)
MCV RBC AUTO: 70 FL (ref 79–97)
MONOCYTES # BLD AUTO: 0.5 X10E3/UL (ref 0.1–0.9)
MONOCYTES NFR BLD AUTO: 7 %
NEUTROPHILS # BLD AUTO: 4.6 X10E3/UL (ref 1.4–7)
NEUTROPHILS NFR BLD AUTO: 60 %
PATH INTERP BLD-IMP: ABNORMAL
PATH REV BLD -IMP: ABNORMAL
PATHOLOGIST NAME: ABNORMAL
PLATELET # BLD AUTO: 395 X10E3/UL (ref 150–450)
POTASSIUM SERPL-SCNC: 4.8 MMOL/L (ref 3.5–5.2)
PROT SERPL-MCNC: 7.3 G/DL (ref 6–8.5)
RBC # BLD AUTO: 3.68 X10E6/UL (ref 3.77–5.28)
SODIUM SERPL-SCNC: 139 MMOL/L (ref 136–145)
TIBC SERPL-MCNC: 571 MCG/DL
TTG IGA SER-ACNC: <2 U/ML (ref 0–3)
TTG IGG SER-ACNC: 5 U/ML (ref 0–5)
UIBC SERPL-MCNC: 559 MCG/DL (ref 112–346)
UREA BREATH TEST QL: NEGATIVE
WBC # BLD AUTO: 7.6 X10E3/UL (ref 3.4–10.8)

## 2021-03-23 NOTE — PROGRESS NOTES
Can you let the patient know that her blood counts have worsened and she needs to get blood.  She needs to report to the ED.

## 2021-03-25 ENCOUNTER — TELEPHONE (OUTPATIENT)
Dept: GASTROENTEROLOGY | Facility: CLINIC | Age: 71
End: 2021-03-25

## 2021-03-25 ENCOUNTER — HOSPITAL ENCOUNTER (OUTPATIENT)
Facility: HOSPITAL | Age: 71
Discharge: ANOTHER HOSPITAL | End: 2021-03-27
Attending: EMERGENCY MEDICINE | Admitting: OBSTETRICS & GYNECOLOGY

## 2021-03-25 DIAGNOSIS — N95.0 PMB (POSTMENOPAUSAL BLEEDING): ICD-10-CM

## 2021-03-25 DIAGNOSIS — D50.0 BLOOD LOSS ANEMIA: ICD-10-CM

## 2021-03-25 DIAGNOSIS — K92.2 ACUTE GI BLEEDING: ICD-10-CM

## 2021-03-25 DIAGNOSIS — D64.9 SYMPTOMATIC ANEMIA: Primary | ICD-10-CM

## 2021-03-25 DIAGNOSIS — R93.5 ABNORMAL ULTRASOUND OF ENDOMETRIUM: ICD-10-CM

## 2021-03-25 LAB
ABO GROUP BLD: NORMAL
ABO GROUP BLD: NORMAL
ALBUMIN SERPL-MCNC: 3.7 G/DL (ref 3.5–5.2)
ALBUMIN/GLOB SERPL: 1.1 G/DL
ALP SERPL-CCNC: 122 U/L (ref 39–117)
ALT SERPL W P-5'-P-CCNC: 9 U/L (ref 1–33)
ANION GAP SERPL CALCULATED.3IONS-SCNC: 8 MMOL/L (ref 5–15)
ANISOCYTOSIS BLD QL: NORMAL
AST SERPL-CCNC: 24 U/L (ref 1–32)
BASOPHILS # BLD AUTO: 0.06 10*3/MM3 (ref 0–0.2)
BASOPHILS NFR BLD AUTO: 1 % (ref 0–1.5)
BILIRUB SERPL-MCNC: 0.2 MG/DL (ref 0–1.2)
BLD GP AB SCN SERPL QL: NEGATIVE
BUN SERPL-MCNC: 14 MG/DL (ref 8–23)
BUN/CREAT SERPL: 12.1 (ref 7–25)
CALCIUM SPEC-SCNC: 9.4 MG/DL (ref 8.6–10.5)
CHLORIDE SERPL-SCNC: 106 MMOL/L (ref 98–107)
CO2 SERPL-SCNC: 27 MMOL/L (ref 22–29)
CREAT SERPL-MCNC: 1.16 MG/DL (ref 0.57–1)
DACRYOCYTES BLD QL SMEAR: NORMAL
DEPRECATED RDW RBC AUTO: 55.7 FL (ref 37–54)
ELLIPTOCYTES BLD QL SMEAR: NORMAL
EOSINOPHIL # BLD AUTO: 0.47 10*3/MM3 (ref 0–0.4)
EOSINOPHIL NFR BLD AUTO: 7.8 % (ref 0.3–6.2)
ERYTHROCYTE [DISTWIDTH] IN BLOOD BY AUTOMATED COUNT: 21.4 % (ref 12.3–15.4)
GFR SERPL CREATININE-BSD FRML MDRD: 46 ML/MIN/1.73
GLOBULIN UR ELPH-MCNC: 3.4 GM/DL
GLUCOSE BLDC GLUCOMTR-MCNC: 143 MG/DL (ref 70–130)
GLUCOSE BLDC GLUCOMTR-MCNC: 158 MG/DL (ref 70–130)
GLUCOSE SERPL-MCNC: 152 MG/DL (ref 65–99)
HCT VFR BLD AUTO: 26.2 % (ref 34–46.6)
HCT VFR BLD AUTO: 28 % (ref 34–46.6)
HGB BLD-MCNC: 6.7 G/DL (ref 12–15.9)
HGB BLD-MCNC: 7.8 G/DL (ref 12–15.9)
HYPOCHROMIA BLD QL: NORMAL
IMM GRANULOCYTES # BLD AUTO: 0.01 10*3/MM3 (ref 0–0.05)
IMM GRANULOCYTES NFR BLD AUTO: 0.2 % (ref 0–0.5)
INR PPP: 1.01 (ref 0.9–1.1)
LYMPHOCYTES # BLD AUTO: 1.59 10*3/MM3 (ref 0.7–3.1)
LYMPHOCYTES NFR BLD AUTO: 26.3 % (ref 19.6–45.3)
MCH RBC QN AUTO: 18.6 PG (ref 26.6–33)
MCHC RBC AUTO-ENTMCNC: 25.6 G/DL (ref 31.5–35.7)
MCV RBC AUTO: 72.6 FL (ref 79–97)
MICROCYTES BLD QL: NORMAL
MONOCYTES # BLD AUTO: 0.57 10*3/MM3 (ref 0.1–0.9)
MONOCYTES NFR BLD AUTO: 9.4 % (ref 5–12)
NEUTROPHILS NFR BLD AUTO: 3.34 10*3/MM3 (ref 1.7–7)
NEUTROPHILS NFR BLD AUTO: 55.3 % (ref 42.7–76)
NRBC BLD AUTO-RTO: 0 /100 WBC (ref 0–0.2)
PLATELET # BLD AUTO: 179 10*3/MM3 (ref 140–450)
PMV BLD AUTO: 10.5 FL (ref 6–12)
POIKILOCYTOSIS BLD QL SMEAR: NORMAL
POTASSIUM SERPL-SCNC: 3.8 MMOL/L (ref 3.5–5.2)
PROT SERPL-MCNC: 7.1 G/DL (ref 6–8.5)
PROTHROMBIN TIME: 13.7 SECONDS (ref 12–15.1)
RBC # BLD AUTO: 3.61 10*6/MM3 (ref 3.77–5.28)
RH BLD: POSITIVE
RH BLD: POSITIVE
SARS-COV-2 RNA PNL SPEC NAA+PROBE: NOT DETECTED
SMALL PLATELETS BLD QL SMEAR: ADEQUATE
SODIUM SERPL-SCNC: 141 MMOL/L (ref 136–145)
T&S EXPIRATION DATE: NORMAL
WBC # BLD AUTO: 6.04 10*3/MM3 (ref 3.4–10.8)
WBC MORPH BLD: NORMAL

## 2021-03-25 PROCEDURE — 86900 BLOOD TYPING SEROLOGIC ABO: CPT

## 2021-03-25 PROCEDURE — 85014 HEMATOCRIT: CPT | Performed by: NURSE PRACTITIONER

## 2021-03-25 PROCEDURE — 85007 BL SMEAR W/DIFF WBC COUNT: CPT | Performed by: EMERGENCY MEDICINE

## 2021-03-25 PROCEDURE — 87635 SARS-COV-2 COVID-19 AMP PRB: CPT | Performed by: INTERNAL MEDICINE

## 2021-03-25 PROCEDURE — 86920 COMPATIBILITY TEST SPIN: CPT

## 2021-03-25 PROCEDURE — C9803 HOPD COVID-19 SPEC COLLECT: HCPCS

## 2021-03-25 PROCEDURE — P9016 RBC LEUKOCYTES REDUCED: HCPCS

## 2021-03-25 PROCEDURE — 85610 PROTHROMBIN TIME: CPT | Performed by: EMERGENCY MEDICINE

## 2021-03-25 PROCEDURE — 85018 HEMOGLOBIN: CPT | Performed by: NURSE PRACTITIONER

## 2021-03-25 PROCEDURE — G0378 HOSPITAL OBSERVATION PER HR: HCPCS

## 2021-03-25 PROCEDURE — 86901 BLOOD TYPING SEROLOGIC RH(D): CPT | Performed by: EMERGENCY MEDICINE

## 2021-03-25 PROCEDURE — 82962 GLUCOSE BLOOD TEST: CPT

## 2021-03-25 PROCEDURE — 86850 RBC ANTIBODY SCREEN: CPT | Performed by: EMERGENCY MEDICINE

## 2021-03-25 PROCEDURE — 99284 EMERGENCY DEPT VISIT MOD MDM: CPT

## 2021-03-25 PROCEDURE — 99218 PR INITIAL OBSERVATION CARE/DAY 30 MINUTES: CPT | Performed by: INTERNAL MEDICINE

## 2021-03-25 PROCEDURE — 85025 COMPLETE CBC W/AUTO DIFF WBC: CPT | Performed by: EMERGENCY MEDICINE

## 2021-03-25 PROCEDURE — 36430 TRANSFUSION BLD/BLD COMPNT: CPT

## 2021-03-25 PROCEDURE — 86901 BLOOD TYPING SEROLOGIC RH(D): CPT

## 2021-03-25 PROCEDURE — 99219 PR INITIAL OBSERVATION CARE/DAY 50 MINUTES: CPT | Performed by: NURSE PRACTITIONER

## 2021-03-25 PROCEDURE — 86900 BLOOD TYPING SEROLOGIC ABO: CPT | Performed by: EMERGENCY MEDICINE

## 2021-03-25 PROCEDURE — 80053 COMPREHEN METABOLIC PANEL: CPT | Performed by: EMERGENCY MEDICINE

## 2021-03-25 RX ORDER — PANTOPRAZOLE SODIUM 40 MG/10ML
40 INJECTION, POWDER, LYOPHILIZED, FOR SOLUTION INTRAVENOUS
Status: DISCONTINUED | OUTPATIENT
Start: 2021-03-25 | End: 2021-03-27 | Stop reason: HOSPADM

## 2021-03-25 RX ORDER — SODIUM CHLORIDE 0.9 % (FLUSH) 0.9 %
10 SYRINGE (ML) INJECTION EVERY 12 HOURS SCHEDULED
Status: DISCONTINUED | OUTPATIENT
Start: 2021-03-25 | End: 2021-03-27 | Stop reason: HOSPADM

## 2021-03-25 RX ORDER — BUDESONIDE AND FORMOTEROL FUMARATE DIHYDRATE 160; 4.5 UG/1; UG/1
2 AEROSOL RESPIRATORY (INHALATION)
Status: DISCONTINUED | OUTPATIENT
Start: 2021-03-26 | End: 2021-03-27 | Stop reason: HOSPADM

## 2021-03-25 RX ORDER — LOSARTAN POTASSIUM 50 MG/1
50 TABLET ORAL
Status: DISCONTINUED | OUTPATIENT
Start: 2021-03-25 | End: 2021-03-27 | Stop reason: HOSPADM

## 2021-03-25 RX ORDER — SODIUM CHLORIDE 0.9 % (FLUSH) 0.9 %
10 SYRINGE (ML) INJECTION AS NEEDED
Status: DISCONTINUED | OUTPATIENT
Start: 2021-03-25 | End: 2021-03-27 | Stop reason: HOSPADM

## 2021-03-25 RX ORDER — POLYETHYLENE GLYCOL 3350, SODIUM CHLORIDE, POTASSIUM CHLORIDE, SODIUM BICARBONATE, AND SODIUM SULFATE 240; 5.84; 2.98; 6.72; 22.72 G/4L; G/4L; G/4L; G/4L; G/4L
4000 POWDER, FOR SOLUTION ORAL SEE ADMIN INSTRUCTIONS
Status: DISCONTINUED | OUTPATIENT
Start: 2021-03-25 | End: 2021-03-26

## 2021-03-25 RX ORDER — ATORVASTATIN CALCIUM 20 MG/1
20 TABLET, FILM COATED ORAL NIGHTLY
Status: DISCONTINUED | OUTPATIENT
Start: 2021-03-25 | End: 2021-03-27 | Stop reason: HOSPADM

## 2021-03-25 RX ORDER — ONDANSETRON 4 MG/1
4 TABLET, FILM COATED ORAL EVERY 6 HOURS PRN
Status: DISCONTINUED | OUTPATIENT
Start: 2021-03-25 | End: 2021-03-27 | Stop reason: HOSPADM

## 2021-03-25 RX ORDER — SODIUM CHLORIDE 9 MG/ML
70 INJECTION, SOLUTION INTRAVENOUS CONTINUOUS PRN
Status: DISCONTINUED | OUTPATIENT
Start: 2021-03-25 | End: 2021-03-27 | Stop reason: HOSPADM

## 2021-03-25 RX ORDER — ONDANSETRON 2 MG/ML
4 INJECTION INTRAMUSCULAR; INTRAVENOUS EVERY 6 HOURS PRN
Status: DISCONTINUED | OUTPATIENT
Start: 2021-03-25 | End: 2021-03-27 | Stop reason: HOSPADM

## 2021-03-25 RX ORDER — BISACODYL 5 MG/1
20 TABLET, DELAYED RELEASE ORAL ONCE
Status: COMPLETED | OUTPATIENT
Start: 2021-03-25 | End: 2021-03-25

## 2021-03-25 RX ADMIN — ATORVASTATIN CALCIUM 20 MG: 20 TABLET, FILM COATED ORAL at 21:36

## 2021-03-25 RX ADMIN — POLYETHYLENE GLYCOL-3350 AND ELECTROLYTES WITH FLAVOR PACK 4000 ML: 240; 5.84; 2.98; 6.72; 22.72 POWDER, FOR SOLUTION ORAL at 18:49

## 2021-03-25 RX ADMIN — BISACODYL 20 MG: 5 TABLET, COATED ORAL at 16:37

## 2021-03-25 RX ADMIN — SODIUM CHLORIDE, PRESERVATIVE FREE 10 ML: 5 INJECTION INTRAVENOUS at 21:36

## 2021-03-25 RX ADMIN — LOSARTAN POTASSIUM 50 MG: 50 TABLET, FILM COATED ORAL at 16:36

## 2021-03-25 NOTE — TELEPHONE ENCOUNTER
----- Message from JENNIFER Lyles sent at 3/24/2021 12:17 PM EDT -----  Can you let the patient know that her blood counts have worsened and she needs to get blood.  She needs to report to the ED.     This was a duplicate but just making sure you called her. Thanks

## 2021-03-25 NOTE — TELEPHONE ENCOUNTER
I called the patient today and instructed her to go to the ED per Gilda Allen due to her worsening blood counts. The patient will report to [facility] ED immediately.

## 2021-03-26 ENCOUNTER — ANESTHESIA (OUTPATIENT)
Dept: GASTROENTEROLOGY | Facility: HOSPITAL | Age: 71
End: 2021-03-26

## 2021-03-26 ENCOUNTER — APPOINTMENT (OUTPATIENT)
Dept: CT IMAGING | Facility: HOSPITAL | Age: 71
End: 2021-03-26

## 2021-03-26 ENCOUNTER — APPOINTMENT (OUTPATIENT)
Dept: ULTRASOUND IMAGING | Facility: HOSPITAL | Age: 71
End: 2021-03-26

## 2021-03-26 ENCOUNTER — ANESTHESIA EVENT (OUTPATIENT)
Dept: GASTROENTEROLOGY | Facility: HOSPITAL | Age: 71
End: 2021-03-26

## 2021-03-26 PROBLEM — D64.9 SYMPTOMATIC ANEMIA: Status: ACTIVE | Noted: 2021-03-25

## 2021-03-26 PROBLEM — R93.5 ABNORMAL ULTRASOUND OF ENDOMETRIUM: Status: ACTIVE | Noted: 2021-03-25

## 2021-03-26 PROBLEM — N95.0 PMB (POSTMENOPAUSAL BLEEDING): Status: ACTIVE | Noted: 2021-03-25

## 2021-03-26 LAB
ACANTHOCYTES BLD QL SMEAR: NORMAL
ALBUMIN SERPL-MCNC: 3.5 G/DL (ref 3.5–5.2)
ALBUMIN/GLOB SERPL: 1.2 G/DL
ALP SERPL-CCNC: 125 U/L (ref 39–117)
ALT SERPL W P-5'-P-CCNC: 8 U/L (ref 1–33)
ANION GAP SERPL CALCULATED.3IONS-SCNC: 9.6 MMOL/L (ref 5–15)
AST SERPL-CCNC: 19 U/L (ref 1–32)
BASOPHILS # BLD AUTO: 0.09 10*3/MM3 (ref 0–0.2)
BASOPHILS NFR BLD AUTO: 1.1 % (ref 0–1.5)
BILIRUB SERPL-MCNC: 0.4 MG/DL (ref 0–1.2)
BUN SERPL-MCNC: 10 MG/DL (ref 8–23)
BUN/CREAT SERPL: 11.8 (ref 7–25)
CALCIUM SPEC-SCNC: 9.3 MG/DL (ref 8.6–10.5)
CHLORIDE SERPL-SCNC: 104 MMOL/L (ref 98–107)
CO2 SERPL-SCNC: 27.4 MMOL/L (ref 22–29)
CREAT SERPL-MCNC: 0.85 MG/DL (ref 0.57–1)
DEPRECATED RDW RBC AUTO: 58 FL (ref 37–54)
EOSINOPHIL # BLD AUTO: 0.38 10*3/MM3 (ref 0–0.4)
EOSINOPHIL NFR BLD AUTO: 4.6 % (ref 0.3–6.2)
ERYTHROCYTE [DISTWIDTH] IN BLOOD BY AUTOMATED COUNT: 22.2 % (ref 12.3–15.4)
GFR SERPL CREATININE-BSD FRML MDRD: 66 ML/MIN/1.73
GLOBULIN UR ELPH-MCNC: 3 GM/DL
GLUCOSE BLDC GLUCOMTR-MCNC: 125 MG/DL (ref 70–130)
GLUCOSE BLDC GLUCOMTR-MCNC: 126 MG/DL (ref 70–130)
GLUCOSE BLDC GLUCOMTR-MCNC: 143 MG/DL (ref 70–130)
GLUCOSE SERPL-MCNC: 119 MG/DL (ref 65–99)
HCT VFR BLD AUTO: 27 % (ref 34–46.6)
HGB BLD-MCNC: 7.5 G/DL (ref 12–15.9)
HYPOCHROMIA BLD QL: NORMAL
IMM GRANULOCYTES # BLD AUTO: 0.03 10*3/MM3 (ref 0–0.05)
IMM GRANULOCYTES NFR BLD AUTO: 0.4 % (ref 0–0.5)
LYMPHOCYTES # BLD AUTO: 1.75 10*3/MM3 (ref 0.7–3.1)
LYMPHOCYTES NFR BLD AUTO: 21.1 % (ref 19.6–45.3)
MCH RBC QN AUTO: 20.2 PG (ref 26.6–33)
MCHC RBC AUTO-ENTMCNC: 27.8 G/DL (ref 31.5–35.7)
MCV RBC AUTO: 72.8 FL (ref 79–97)
MONOCYTES # BLD AUTO: 0.69 10*3/MM3 (ref 0.1–0.9)
MONOCYTES NFR BLD AUTO: 8.3 % (ref 5–12)
NEUTROPHILS NFR BLD AUTO: 5.35 10*3/MM3 (ref 1.7–7)
NEUTROPHILS NFR BLD AUTO: 64.5 % (ref 42.7–76)
NRBC BLD AUTO-RTO: 0 /100 WBC (ref 0–0.2)
PLATELET # BLD AUTO: 322 10*3/MM3 (ref 140–450)
POTASSIUM SERPL-SCNC: 3.6 MMOL/L (ref 3.5–5.2)
PROT SERPL-MCNC: 6.5 G/DL (ref 6–8.5)
RBC # BLD AUTO: 3.71 10*6/MM3 (ref 3.77–5.28)
SMALL PLATELETS BLD QL SMEAR: ADEQUATE
SODIUM SERPL-SCNC: 141 MMOL/L (ref 136–145)
WBC # BLD AUTO: 8.29 10*3/MM3 (ref 3.4–10.8)
WBC MORPH BLD: NORMAL

## 2021-03-26 PROCEDURE — 99225 PR SBSQ OBSERVATION CARE/DAY 25 MINUTES: CPT | Performed by: OBSTETRICS & GYNECOLOGY

## 2021-03-26 PROCEDURE — G0378 HOSPITAL OBSERVATION PER HR: HCPCS

## 2021-03-26 PROCEDURE — 43239 EGD BIOPSY SINGLE/MULTIPLE: CPT | Performed by: INTERNAL MEDICINE

## 2021-03-26 PROCEDURE — 36430 TRANSFUSION BLD/BLD COMPNT: CPT

## 2021-03-26 PROCEDURE — 45380 COLONOSCOPY AND BIOPSY: CPT | Performed by: INTERNAL MEDICINE

## 2021-03-26 PROCEDURE — 80053 COMPREHEN METABOLIC PANEL: CPT | Performed by: NURSE PRACTITIONER

## 2021-03-26 PROCEDURE — 94640 AIRWAY INHALATION TREATMENT: CPT

## 2021-03-26 PROCEDURE — 71260 CT THORAX DX C+: CPT

## 2021-03-26 PROCEDURE — 86900 BLOOD TYPING SEROLOGIC ABO: CPT

## 2021-03-26 PROCEDURE — 74177 CT ABD & PELVIS W/CONTRAST: CPT

## 2021-03-26 PROCEDURE — 88305 TISSUE EXAM BY PATHOLOGIST: CPT | Performed by: INTERNAL MEDICINE

## 2021-03-26 PROCEDURE — P9016 RBC LEUKOCYTES REDUCED: HCPCS

## 2021-03-26 PROCEDURE — 25010000002 PROPOFOL 200 MG/20ML EMULSION: Performed by: NURSE ANESTHETIST, CERTIFIED REGISTERED

## 2021-03-26 PROCEDURE — 94799 UNLISTED PULMONARY SVC/PX: CPT

## 2021-03-26 PROCEDURE — 45385 COLONOSCOPY W/LESION REMOVAL: CPT | Performed by: INTERNAL MEDICINE

## 2021-03-26 PROCEDURE — 85025 COMPLETE CBC W/AUTO DIFF WBC: CPT | Performed by: NURSE PRACTITIONER

## 2021-03-26 PROCEDURE — 96374 THER/PROPH/DIAG INJ IV PUSH: CPT

## 2021-03-26 PROCEDURE — 43254 EGD ENDO MUCOSAL RESECTION: CPT | Performed by: INTERNAL MEDICINE

## 2021-03-26 PROCEDURE — 99225 PR SBSQ OBSERVATION CARE/DAY 25 MINUTES: CPT | Performed by: NURSE PRACTITIONER

## 2021-03-26 PROCEDURE — 85007 BL SMEAR W/DIFF WBC COUNT: CPT | Performed by: NURSE PRACTITIONER

## 2021-03-26 PROCEDURE — 76830 TRANSVAGINAL US NON-OB: CPT

## 2021-03-26 PROCEDURE — 82962 GLUCOSE BLOOD TEST: CPT

## 2021-03-26 RX ORDER — LABETALOL HYDROCHLORIDE 5 MG/ML
10 INJECTION, SOLUTION INTRAVENOUS EVERY 6 HOURS PRN
Status: DISCONTINUED | OUTPATIENT
Start: 2021-03-26 | End: 2021-03-27 | Stop reason: HOSPADM

## 2021-03-26 RX ORDER — LIDOCAINE HYDROCHLORIDE 20 MG/ML
INJECTION, SOLUTION INTRAVENOUS AS NEEDED
Status: DISCONTINUED | OUTPATIENT
Start: 2021-03-26 | End: 2021-03-26 | Stop reason: SURG

## 2021-03-26 RX ORDER — KETAMINE HYDROCHLORIDE 50 MG/ML
INJECTION, SOLUTION, CONCENTRATE INTRAMUSCULAR; INTRAVENOUS AS NEEDED
Status: DISCONTINUED | OUTPATIENT
Start: 2021-03-26 | End: 2021-03-26 | Stop reason: SURG

## 2021-03-26 RX ORDER — PROPOFOL 10 MG/ML
INJECTION, EMULSION INTRAVENOUS AS NEEDED
Status: DISCONTINUED | OUTPATIENT
Start: 2021-03-26 | End: 2021-03-26 | Stop reason: SURG

## 2021-03-26 RX ORDER — SODIUM CHLORIDE 9 MG/ML
50 INJECTION, SOLUTION INTRAVENOUS CONTINUOUS
Status: DISCONTINUED | OUTPATIENT
Start: 2021-03-26 | End: 2021-03-27 | Stop reason: HOSPADM

## 2021-03-26 RX ADMIN — PANTOPRAZOLE SODIUM 40 MG: 40 INJECTION, POWDER, FOR SOLUTION INTRAVENOUS at 06:36

## 2021-03-26 RX ADMIN — KETAMINE HYDROCHLORIDE 25 MG: 50 INJECTION, SOLUTION INTRAMUSCULAR; INTRAVENOUS at 14:46

## 2021-03-26 RX ADMIN — SODIUM CHLORIDE 70 ML/HR: 9 INJECTION, SOLUTION INTRAVENOUS at 13:21

## 2021-03-26 RX ADMIN — SODIUM CHLORIDE, PRESERVATIVE FREE 10 ML: 5 INJECTION INTRAVENOUS at 08:52

## 2021-03-26 RX ADMIN — LOSARTAN POTASSIUM 50 MG: 50 TABLET, FILM COATED ORAL at 08:52

## 2021-03-26 RX ADMIN — LIDOCAINE HYDROCHLORIDE 50 MG: 20 INJECTION, SOLUTION INTRAVENOUS at 14:26

## 2021-03-26 RX ADMIN — PROPOFOL 100 MG: 10 INJECTION, EMULSION INTRAVENOUS at 14:26

## 2021-03-26 RX ADMIN — PROPOFOL 40 MG: 10 INJECTION, EMULSION INTRAVENOUS at 14:51

## 2021-03-26 RX ADMIN — BUDESONIDE AND FORMOTEROL FUMARATE DIHYDRATE 2 PUFF: 160; 4.5 AEROSOL RESPIRATORY (INHALATION) at 22:09

## 2021-03-26 RX ADMIN — SODIUM CHLORIDE: 9 INJECTION, SOLUTION INTRAVENOUS at 14:19

## 2021-03-26 RX ADMIN — PROPOFOL 100 MG: 10 INJECTION, EMULSION INTRAVENOUS at 14:34

## 2021-03-26 RX ADMIN — PROPOFOL 50 MG: 10 INJECTION, EMULSION INTRAVENOUS at 14:42

## 2021-03-26 RX ADMIN — BUDESONIDE AND FORMOTEROL FUMARATE DIHYDRATE 2 PUFF: 160; 4.5 AEROSOL RESPIRATORY (INHALATION) at 09:19

## 2021-03-26 RX ADMIN — IOPAMIDOL 100 ML: 612 INJECTION, SOLUTION INTRAVENOUS at 23:39

## 2021-03-26 NOTE — ANESTHESIA PREPROCEDURE EVALUATION
Anesthesia Evaluation     Patient summary reviewed and Nursing notes reviewed   NPO Solid Status: > 8 hours  NPO Liquid Status: > 8 hours           Airway   Mallampati: II  TM distance: >3 FB  No difficulty expected  Dental - normal exam     Pulmonary - normal exam   Cardiovascular - normal exam    (+) hypertension well controlled less than 2 medications, past MI  >12 months,       Neuro/Psych  GI/Hepatic/Renal/Endo    (+) obesity, morbid obesity, GERD well controlled, GI bleeding upper ,     Musculoskeletal     Abdominal  - normal exam   Substance History      OB/GYN          Other                      Anesthesia Plan    ASA 3     MAC     intravenous induction     Anesthetic plan, all risks, benefits, and alternatives have been provided, discussed and informed consent has been obtained with: patient.    Plan discussed with CRNA.

## 2021-03-26 NOTE — ANESTHESIA POSTPROCEDURE EVALUATION
Patient: Steven Hardy    Procedure Summary     Date: 03/26/21 Room / Location: [facility] ENDOSCOPY 1 / [facility] ENDOSCOPY    Anesthesia Start: 1419 Anesthesia Stop: 1522    Procedures:       ESOPHAGOGASTRODUODENOSCOPY WITH BIOPSY POYLPECTOMY (N/A Esophagus)      COLONOSCOPY WITH BIOPSY POLYPECTOMY (N/A Anus) Diagnosis:       Symptomatic anemia      (Symptomatic anemia [D64.9])    Surgeons: Roxanne Gutiérrez MD Provider: Jovi Longo CRNA    Anesthesia Type: MAC ASA Status: 3          Anesthesia Type: MAC    Vitals  Vitals Value Taken Time   /79 03/26/21 1616   Temp 97.5 °F (36.4 °C) 03/26/21 1616   Pulse 89 03/26/21 1701   Resp 18 03/26/21 1616   SpO2 97 % 03/26/21 1636   Vitals shown include unvalidated device data.        Post Anesthesia Care and Evaluation    Patient location during evaluation: PACU  Patient participation: complete - patient participated  Level of consciousness: awake and alert  Pain score: 0  Pain management: satisfactory to patient  Airway patency: patent  Anesthetic complications: No anesthetic complications  PONV Status: none  Cardiovascular status: acceptable and hemodynamically stable  Respiratory status: acceptable  Hydration status: acceptable

## 2021-03-27 ENCOUNTER — ANESTHESIA (OUTPATIENT)
Dept: PERIOP | Facility: HOSPITAL | Age: 71
End: 2021-03-27

## 2021-03-27 ENCOUNTER — ANESTHESIA EVENT (OUTPATIENT)
Dept: PERIOP | Facility: HOSPITAL | Age: 71
End: 2021-03-27

## 2021-03-27 VITALS
HEIGHT: 62 IN | SYSTOLIC BLOOD PRESSURE: 171 MMHG | HEART RATE: 76 BPM | WEIGHT: 221.78 LBS | DIASTOLIC BLOOD PRESSURE: 77 MMHG | BODY MASS INDEX: 40.81 KG/M2 | RESPIRATION RATE: 16 BRPM | TEMPERATURE: 97.4 F | OXYGEN SATURATION: 97 %

## 2021-03-27 PROBLEM — E66.01 MORBID OBESITY WITH BMI OF 40.0-44.9, ADULT (HCC): Status: ACTIVE | Noted: 2021-03-27

## 2021-03-27 PROBLEM — K92.2 ACUTE GI BLEEDING: Status: RESOLVED | Noted: 2021-03-25 | Resolved: 2021-03-27

## 2021-03-27 LAB
ALBUMIN SERPL-MCNC: 3.2 G/DL (ref 3.5–5.2)
ALBUMIN/GLOB SERPL: 1 G/DL
ALP SERPL-CCNC: 110 U/L (ref 39–117)
ALT SERPL W P-5'-P-CCNC: 8 U/L (ref 1–33)
ANION GAP SERPL CALCULATED.3IONS-SCNC: 9.7 MMOL/L (ref 5–15)
ANISOCYTOSIS BLD QL: NORMAL
AST SERPL-CCNC: 19 U/L (ref 1–32)
BASOPHILS # BLD AUTO: 0.07 10*3/MM3 (ref 0–0.2)
BASOPHILS NFR BLD AUTO: 0.9 % (ref 0–1.5)
BH BB BLOOD EXPIRATION DATE: NORMAL
BH BB BLOOD EXPIRATION DATE: NORMAL
BH BB BLOOD TYPE BARCODE: 6200
BH BB BLOOD TYPE BARCODE: 6200
BH BB DISPENSE STATUS: NORMAL
BH BB DISPENSE STATUS: NORMAL
BH BB PRODUCT CODE: NORMAL
BH BB PRODUCT CODE: NORMAL
BH BB UNIT NUMBER: NORMAL
BH BB UNIT NUMBER: NORMAL
BILIRUB SERPL-MCNC: 0.4 MG/DL (ref 0–1.2)
BUN SERPL-MCNC: 10 MG/DL (ref 8–23)
BUN/CREAT SERPL: 12.3 (ref 7–25)
CALCIUM SPEC-SCNC: 8.9 MG/DL (ref 8.6–10.5)
CHLORIDE SERPL-SCNC: 105 MMOL/L (ref 98–107)
CO2 SERPL-SCNC: 26.3 MMOL/L (ref 22–29)
CREAT SERPL-MCNC: 0.81 MG/DL (ref 0.57–1)
CROSSMATCH INTERPRETATION: NORMAL
CROSSMATCH INTERPRETATION: NORMAL
DEPRECATED RDW RBC AUTO: 61.4 FL (ref 37–54)
EOSINOPHIL # BLD AUTO: 0.34 10*3/MM3 (ref 0–0.4)
EOSINOPHIL NFR BLD AUTO: 4.3 % (ref 0.3–6.2)
ERYTHROCYTE [DISTWIDTH] IN BLOOD BY AUTOMATED COUNT: 22.5 % (ref 12.3–15.4)
GFR SERPL CREATININE-BSD FRML MDRD: 70 ML/MIN/1.73
GLOBULIN UR ELPH-MCNC: 3.2 GM/DL
GLUCOSE BLDC GLUCOMTR-MCNC: 120 MG/DL (ref 70–130)
GLUCOSE SERPL-MCNC: 106 MG/DL (ref 65–99)
HCT VFR BLD AUTO: 28.3 % (ref 34–46.6)
HGB BLD-MCNC: 8 G/DL (ref 12–15.9)
HYPOCHROMIA BLD QL: NORMAL
IMM GRANULOCYTES # BLD AUTO: 0.02 10*3/MM3 (ref 0–0.05)
IMM GRANULOCYTES NFR BLD AUTO: 0.3 % (ref 0–0.5)
LYMPHOCYTES # BLD AUTO: 1.79 10*3/MM3 (ref 0.7–3.1)
LYMPHOCYTES NFR BLD AUTO: 22.5 % (ref 19.6–45.3)
MCH RBC QN AUTO: 21.4 PG (ref 26.6–33)
MCHC RBC AUTO-ENTMCNC: 28.3 G/DL (ref 31.5–35.7)
MCV RBC AUTO: 75.9 FL (ref 79–97)
MICROCYTES BLD QL: NORMAL
MONOCYTES # BLD AUTO: 0.68 10*3/MM3 (ref 0.1–0.9)
MONOCYTES NFR BLD AUTO: 8.6 % (ref 5–12)
NEUTROPHILS NFR BLD AUTO: 5.04 10*3/MM3 (ref 1.7–7)
NEUTROPHILS NFR BLD AUTO: 63.4 % (ref 42.7–76)
NRBC BLD AUTO-RTO: 0 /100 WBC (ref 0–0.2)
OVALOCYTES BLD QL SMEAR: NORMAL
PLATELET # BLD AUTO: 281 10*3/MM3 (ref 140–450)
PMV BLD AUTO: ABNORMAL FL
POIKILOCYTOSIS BLD QL SMEAR: NORMAL
POTASSIUM SERPL-SCNC: 3.6 MMOL/L (ref 3.5–5.2)
PROT SERPL-MCNC: 6.4 G/DL (ref 6–8.5)
RBC # BLD AUTO: 3.73 10*6/MM3 (ref 3.77–5.28)
SMALL PLATELETS BLD QL SMEAR: ADEQUATE
SODIUM SERPL-SCNC: 141 MMOL/L (ref 136–145)
UNIT  ABO: NORMAL
UNIT  ABO: NORMAL
UNIT  RH: NORMAL
UNIT  RH: NORMAL
WBC # BLD AUTO: 7.94 10*3/MM3 (ref 3.4–10.8)
WBC MORPH BLD: NORMAL

## 2021-03-27 PROCEDURE — 25010000002 PROPOFOL 10 MG/ML EMULSION: Performed by: NURSE ANESTHETIST, CERTIFIED REGISTERED

## 2021-03-27 PROCEDURE — C2628 CATHETER, OCCLUSION: HCPCS

## 2021-03-27 PROCEDURE — G0378 HOSPITAL OBSERVATION PER HR: HCPCS

## 2021-03-27 PROCEDURE — 85025 COMPLETE CBC W/AUTO DIFF WBC: CPT | Performed by: INTERNAL MEDICINE

## 2021-03-27 PROCEDURE — 80053 COMPREHEN METABOLIC PANEL: CPT | Performed by: INTERNAL MEDICINE

## 2021-03-27 PROCEDURE — 36430 TRANSFUSION BLD/BLD COMPNT: CPT

## 2021-03-27 PROCEDURE — 85007 BL SMEAR W/DIFF WBC COUNT: CPT | Performed by: INTERNAL MEDICINE

## 2021-03-27 PROCEDURE — 88305 TISSUE EXAM BY PATHOLOGIST: CPT | Performed by: OBSTETRICS & GYNECOLOGY

## 2021-03-27 PROCEDURE — 58558 HYSTEROSCOPY BIOPSY: CPT | Performed by: OBSTETRICS & GYNECOLOGY

## 2021-03-27 PROCEDURE — 25010000002 IOPAMIDOL 61 % SOLUTION: Performed by: INTERNAL MEDICINE

## 2021-03-27 PROCEDURE — 94799 UNLISTED PULMONARY SVC/PX: CPT

## 2021-03-27 PROCEDURE — 99217 PR OBSERVATION CARE DISCHARGE MANAGEMENT: CPT | Performed by: INTERNAL MEDICINE

## 2021-03-27 PROCEDURE — P9016 RBC LEUKOCYTES REDUCED: HCPCS

## 2021-03-27 PROCEDURE — 82962 GLUCOSE BLOOD TEST: CPT

## 2021-03-27 PROCEDURE — 25010000002 FENTANYL CITRATE (PF) 100 MCG/2ML SOLUTION: Performed by: NURSE ANESTHETIST, CERTIFIED REGISTERED

## 2021-03-27 PROCEDURE — 86900 BLOOD TYPING SEROLOGIC ABO: CPT

## 2021-03-27 RX ORDER — MAGNESIUM HYDROXIDE 1200 MG/15ML
LIQUID ORAL AS NEEDED
Status: DISCONTINUED | OUTPATIENT
Start: 2021-03-27 | End: 2021-03-27 | Stop reason: HOSPADM

## 2021-03-27 RX ORDER — FENTANYL CITRATE 50 UG/ML
INJECTION, SOLUTION INTRAMUSCULAR; INTRAVENOUS AS NEEDED
Status: DISCONTINUED | OUTPATIENT
Start: 2021-03-27 | End: 2021-03-27 | Stop reason: SURG

## 2021-03-27 RX ORDER — KETAMINE HYDROCHLORIDE 50 MG/ML
INJECTION, SOLUTION, CONCENTRATE INTRAMUSCULAR; INTRAVENOUS AS NEEDED
Status: DISCONTINUED | OUTPATIENT
Start: 2021-03-27 | End: 2021-03-27 | Stop reason: SURG

## 2021-03-27 RX ORDER — FERRIC SUBSULFATE 0.21 G/G
LIQUID TOPICAL AS NEEDED
Status: DISCONTINUED | OUTPATIENT
Start: 2021-03-27 | End: 2021-03-27 | Stop reason: HOSPADM

## 2021-03-27 RX ADMIN — SODIUM CHLORIDE, PRESERVATIVE FREE 10 ML: 5 INJECTION INTRAVENOUS at 08:30

## 2021-03-27 RX ADMIN — PROPOFOL 100 MCG/KG/MIN: 10 INJECTION, EMULSION INTRAVENOUS at 09:40

## 2021-03-27 RX ADMIN — SODIUM CHLORIDE: 9 INJECTION, SOLUTION INTRAVENOUS at 10:34

## 2021-03-27 RX ADMIN — KETAMINE HYDROCHLORIDE 25 MG: 50 INJECTION, SOLUTION INTRAMUSCULAR; INTRAVENOUS at 09:42

## 2021-03-27 RX ADMIN — KETAMINE HYDROCHLORIDE 25 MG: 50 INJECTION, SOLUTION INTRAMUSCULAR; INTRAVENOUS at 09:50

## 2021-03-27 RX ADMIN — BUDESONIDE AND FORMOTEROL FUMARATE DIHYDRATE 2 PUFF: 160; 4.5 AEROSOL RESPIRATORY (INHALATION) at 07:18

## 2021-03-27 RX ADMIN — PANTOPRAZOLE SODIUM 40 MG: 40 INJECTION, POWDER, FOR SOLUTION INTRAVENOUS at 06:21

## 2021-03-27 RX ADMIN — FENTANYL CITRATE 100 MCG: 50 INJECTION INTRAMUSCULAR; INTRAVENOUS at 09:37

## 2021-03-27 NOTE — ANESTHESIA POSTPROCEDURE EVALUATION
Patient: Steven Hardy    Procedure Summary     Date: 03/27/21 Room / Location: [facility] OR 2 /  [facility]    Anesthesia Start: 0936 Anesthesia Stop: 1048    Procedure: DILATATION AND CURETTAGE (N/A Uterus) Diagnosis:       PMB (postmenopausal bleeding)      Abnormal ultrasound of endometrium      (PMB (postmenopausal bleeding) [N95.0])      (Abnormal ultrasound of endometrium [R93.5])    Surgeons: Yumiko Velasco MD Provider: Vega Wilkerson CRNA    Anesthesia Type: MAC ASA Status: 3          Anesthesia Type: MAC    Vitals  Vitals Value Taken Time   /79 03/27/21 1045   Temp     Pulse 88 03/27/21 1048   Resp     SpO2 100 % 03/27/21 1048   Vitals shown include unvalidated device data.        Post Anesthesia Care and Evaluation    Patient location during evaluation: bedside  Patient participation: complete - patient participated  Level of consciousness: awake and alert  Pain score: 0  Pain management: adequate  Airway patency: patent  Anesthetic complications: No anesthetic complications  PONV Status: none  Cardiovascular status: acceptable  Respiratory status: acceptable  Hydration status: acceptable

## 2021-03-28 LAB
BH BB BLOOD EXPIRATION DATE: NORMAL
BH BB BLOOD EXPIRATION DATE: NORMAL
BH BB BLOOD TYPE BARCODE: 6200
BH BB BLOOD TYPE BARCODE: 6200
BH BB DISPENSE STATUS: NORMAL
BH BB DISPENSE STATUS: NORMAL
BH BB PRODUCT CODE: NORMAL
BH BB PRODUCT CODE: NORMAL
BH BB UNIT NUMBER: NORMAL
BH BB UNIT NUMBER: NORMAL
CROSSMATCH INTERPRETATION: NORMAL
CROSSMATCH INTERPRETATION: NORMAL
UNIT  ABO: NORMAL
UNIT  ABO: NORMAL
UNIT  RH: NORMAL
UNIT  RH: NORMAL

## 2021-03-31 LAB
LAB AP CASE REPORT: NORMAL
PATH REPORT.FINAL DX SPEC: NORMAL

## 2021-04-01 LAB
LAB AP CASE REPORT: NORMAL
PATH REPORT.FINAL DX SPEC: NORMAL

## 2021-10-26 ENCOUNTER — TRANSCRIBE ORDERS (OUTPATIENT)
Dept: ADMINISTRATIVE | Facility: HOSPITAL | Age: 71
End: 2021-10-26

## 2021-10-26 DIAGNOSIS — Z12.31 VISIT FOR SCREENING MAMMOGRAM: Primary | ICD-10-CM

## 2021-11-08 ENCOUNTER — TRANSCRIBE ORDERS (OUTPATIENT)
Dept: ADMINISTRATIVE | Facility: HOSPITAL | Age: 71
End: 2021-11-08

## 2021-11-08 DIAGNOSIS — Z78.0 POSTMENOPAUSAL: Primary | ICD-10-CM

## 2021-11-15 ENCOUNTER — APPOINTMENT (OUTPATIENT)
Dept: BONE DENSITY | Facility: HOSPITAL | Age: 71
End: 2021-11-15

## 2021-12-06 DIAGNOSIS — K92.2 GASTROINTESTINAL HEMORRHAGE, UNSPECIFIED GASTROINTESTINAL HEMORRHAGE TYPE: ICD-10-CM

## 2021-12-06 RX ORDER — PANTOPRAZOLE SODIUM 40 MG/1
40 TABLET, DELAYED RELEASE ORAL DAILY
Qty: 30 TABLET | Refills: 1 | Status: SHIPPED | OUTPATIENT
Start: 2021-12-06 | End: 2021-12-06 | Stop reason: SDUPTHER

## 2021-12-06 RX ORDER — PANTOPRAZOLE SODIUM 40 MG/1
40 TABLET, DELAYED RELEASE ORAL DAILY
Qty: 30 TABLET | Refills: 1 | Status: SHIPPED | OUTPATIENT
Start: 2021-12-06 | End: 2022-02-07

## 2021-12-07 ENCOUNTER — APPOINTMENT (OUTPATIENT)
Dept: MAMMOGRAPHY | Facility: HOSPITAL | Age: 71
End: 2021-12-07

## 2021-12-09 ENCOUNTER — APPOINTMENT (OUTPATIENT)
Dept: BONE DENSITY | Facility: HOSPITAL | Age: 71
End: 2021-12-09

## 2021-12-09 ENCOUNTER — HOSPITAL ENCOUNTER (OUTPATIENT)
Dept: MAMMOGRAPHY | Facility: HOSPITAL | Age: 71
Discharge: HOME | End: 2021-12-09

## 2021-12-09 DIAGNOSIS — Z78.0 POSTMENOPAUSAL: ICD-10-CM

## 2021-12-09 DIAGNOSIS — Z12.31 VISIT FOR SCREENING MAMMOGRAM: ICD-10-CM

## 2021-12-09 PROCEDURE — 77067 SCR MAMMO BI INCL CAD: CPT

## 2021-12-09 PROCEDURE — 77063 BREAST TOMOSYNTHESIS BI: CPT

## 2021-12-09 PROCEDURE — 77080 DXA BONE DENSITY AXIAL: CPT

## 2022-02-07 DIAGNOSIS — K92.2 GASTROINTESTINAL HEMORRHAGE, UNSPECIFIED GASTROINTESTINAL HEMORRHAGE TYPE: ICD-10-CM

## 2022-02-07 RX ORDER — PANTOPRAZOLE SODIUM 40 MG/1
TABLET, DELAYED RELEASE ORAL
Qty: 30 TABLET | Refills: 1 | Status: SHIPPED | OUTPATIENT
Start: 2022-02-07

## 2024-04-02 ENCOUNTER — TELEPHONE (OUTPATIENT)
Dept: URGENT CARE | Facility: CLINIC | Age: 74
End: 2024-04-02
Payer: MEDICARE

## 2024-04-02 DIAGNOSIS — N39.0 ACUTE URINARY TRACT INFECTION: Primary | ICD-10-CM

## 2024-04-02 RX ORDER — CEFUROXIME AXETIL 500 MG/1
500 TABLET ORAL 2 TIMES DAILY
Qty: 14 TABLET | Refills: 0 | Status: SHIPPED | OUTPATIENT
Start: 2024-04-02

## (undated) DEVICE — SOL IRR H2O BTL 1000ML STRL

## (undated) DEVICE — ELECTRD PAD DEFIB A/

## (undated) DEVICE — ST IRR CYSTO W/SPK 77IN LF

## (undated) DEVICE — CATH F6INF TL AL I 100CM: Brand: INFINITI

## (undated) DEVICE — GLV SURG BIOGEL M LTX PF 6 1/2

## (undated) DEVICE — SINGLE-USE POLYPECTOMY SNARE: Brand: CAPTIVATOR II

## (undated) DEVICE — GLIDESHEATH ACCESS KIT HYDROPHILIC COATED INTRODUCER SHEATH: Brand: GLIDESHEATH

## (undated) DEVICE — SUT GUT CHRM 2/0 SH 27IN G123H

## (undated) DEVICE — FRCP BIOP COLD ENDOJAW ALLGTR W/NDL 2.8X2300MM BLU

## (undated) DEVICE — LUBE JELLY PK/2.75GM STRL BX/144

## (undated) DEVICE — PREMIUM WET SKIN PREP TRAY CHG: Brand: MEDLINE INDUSTRIES, INC.

## (undated) DEVICE — PAD GRND REM POLYHESIVE A/ DISP

## (undated) DEVICE — CATH F6 ST JR 4 100CM: Brand: SUPERTORQUE

## (undated) DEVICE — RICH MINOR LITHOTOMY: Brand: MEDLINE INDUSTRIES, INC.

## (undated) DEVICE — TUBING, SUCTION, 1/4" X 12', STRAIGHT: Brand: MEDLINE

## (undated) DEVICE — ANGIOGRAPHIC CATHETER: Brand: EXPO™

## (undated) DEVICE — CONMED SCOPE SAVER BITE BLOCK, 20X27 MM: Brand: SCOPE SAVER

## (undated) DEVICE — GW EMR FIX EXCHG J STD .035 3MM 260CM

## (undated) DEVICE — LASAR SWABS: Brand: DEROYAL

## (undated) DEVICE — SOL NACL 0.9PCT 1000ML

## (undated) DEVICE — TR BAND RADIAL ARTERY COMPRESSION DEVICE: Brand: TR BAND

## (undated) DEVICE — ENDOSCOPY PORT CONNECTOR FOR OLYMPUS® SCOPES: Brand: ERBE

## (undated) DEVICE — SUCTION CANISTER, 1500CC, RIGID: Brand: DEROYAL

## (undated) DEVICE — 100% SILICONE FOLEY CATHETER, COUNCIL TIP, 5 CC, 2-WAY, 20 FR (6.7 MM): Brand: DOVER

## (undated) DEVICE — Device

## (undated) DEVICE — PAD SANI MAXI W/ADHS SNG WRP 11IN

## (undated) DEVICE — QUICK CATCH IN-LINE SUCTION POLYP TRAP IS USED FOR SUCTION RETRIEVAL OF ENDOSCOPICALLY REMOVED POLYPS.

## (undated) DEVICE — VLV SXN AIR/H2O ORCAPOD3 1P/U STRL

## (undated) DEVICE — HYBRID TUBING/CAP SET FOR OLYMPUS® SCOPES: Brand: ERBE

## (undated) DEVICE — RADIFOCUS OPTITORQUE ANGIOGRAPHIC CATHETER: Brand: OPTITORQUE

## (undated) DEVICE — TP SXN YANKR BULB STRL